# Patient Record
Sex: MALE | Race: WHITE
[De-identification: names, ages, dates, MRNs, and addresses within clinical notes are randomized per-mention and may not be internally consistent; named-entity substitution may affect disease eponyms.]

---

## 2017-09-19 NOTE — EDPHY
H & P


Stated Complaint: Abd/back pain, N/D, sweats x3 days


HPI/ROS: 





HPI





CHIEF COMPLAINT:  Abdominal pain, diarrhea, nausea





HISTORY OF PRESENT ILLNESS:  Patient very pleasant 37-year-old male significant 

past medical history for methamphetamine abuse, he presents to the emergency 

room with 24 hours of nausea, abdominal pain mainly right-sided right lower 

quadrant right upper quadrant, diarrhea that is nonbloody.  Associated nausea 

no fever no vomiting.  Denies chest pain or shortness of breath.  Pain is 

currently 410.  Diffuse.  Mainly right-sided.





Additional note patient is requesting STI testing.  He is unsure if he is a 

current gonorrhea chlamydia.  He has no symptoms but states that him and his ex-

girlfriend has been passing around an STI.





Past Medical History:  Methamphetamine abuse





Past Surgical History:  Tonsillectomy





Social History:  Daily methamphetamine use





Family History:  Noncontributory








ROS   


REVIEW OF SYSTEMS:


A comprehensive 10 point review of systems is otherwise negative aside from 

elements mentioned in the history of present illness.








Exam   


Constitutional  appears well nontoxic triage nursing summary reviewed, vital 

signs reviewed, awake/alert. 


Eyes   normal conjunctivae and sclera, EOMI, PERRLA. 


HENT   normal inspection, atraumatic, moist mucus membranes, no epistaxis, neck 

supple/ no meningismus, no raccoon eyes. 


Respiratory   clear to auscultation bilaterally, normal breath sounds, no 

respiratory distress, no wheezing. 


Cardiovascular   rate normal, regular rhythm, no murmur, no edema, distal 

pulses normal. 


Gastrointestinal   soft, mild tender palpation right upper quadrant and right 

side of the abdomen right lower quadrant, no rebound, no guarding, normal bowel 

sounds, no distension, no pulsatile mass. 


Genitourinary   no CVA tenderness. 


Musculoskeletal  no midline vertebral tenderness, full range of motion, no calf 

swelling, no tenderness of extremities, no meningismus, good pulses, 

neurovascularly intact.


Skin   pink, warm, & dry, no rash, skin atraumatic. 


Neurologic   awake, alert and oriented x 3, AAOx3, moves all 4 extremities 

equally, motor intact, sensory intact, CN II-XII intact, normal cerebellar, 

normal vision, normal speech. 


Psychiatric   normal mood/affect. 


Heme/Lymph/Immune   no lymphadenopathy.





Differential diagnosis includes but is not limited to and in no particular order

:  Bowel obstruction, appendicitis, gallbladder disease, diverticulitis, colitis

, enteritis, perforated viscus, gastritis, GERD, esophagitis, urinary tract 

infection, pyelonephritis, kidney stones





Medical Decision Making:  IV establishment, IV fluid bolus, Zofran for nausea, 

morphine for pain control, stool studies, abdominal labs, CT scan abdomen 

pelvis with IV contrast rule out acute appendicitis re-evaluate.





Re-evaluation:








CT scan of the abdomen pelvis with IV contrast  The results of the study are 

constipation.  Otherwise unremarkable CT scan.  The study was read by Dr. Malcolm  I viewed the images myself on the PACS system.








0340:  I did re-evaluate this patient this time resting comfortably no acute 

distress.  Abdomen is soft nontender.  He feels better.  Blood work is 

reviewed.  CT scan reviewed.  I do not appreciate anything acute he feels 

better.  Re-examination abdomen is soft.  Requesting be discharged home. 


Source: Patient





- Personal History


Current Tetanus/Diphtheria Vaccine: No


Current Tetanus Diphtheria and Acellular Pertussis (TDAP): No





- Medical/Surgical History


Hx Asthma: No


Hx Chronic Respiratory Disease: No


Hx Diabetes: No


Hx Cardiac Disease: No


Hx Renal Disease: No


Hx Cirrhosis: No


Hx Alcoholism: No


Hx HIV/AIDS: No


Hx Splenectomy or Spleen Trauma: No


Other PMH: tonsillectomy





- Social History


Smoking Status: Former smoker


Constitutional: 


 Initial Vital Signs











Temperature (C)  36.6 C   09/19/17 00:50


 


Heart Rate  112 H  09/19/17 00:50


 


Respiratory Rate  18   09/19/17 00:50


 


Blood Pressure  129/92 H  09/19/17 00:50


 


O2 Sat (%)  97   09/19/17 00:50








 











O2 Delivery Mode               Room Air














Allergies/Adverse Reactions: 


 





No Known Allergies Allergy (Verified 09/19/17 00:54)


 








Home Medications: 














 Medication  Instructions  Recorded


 


NK [No Known Home Meds]  09/19/17














Medical Decision Making





- Data Points


Laboratory Results: 


 Laboratory Results





 09/19/17 01:13 





 09/19/17 01:13 





 











  09/19/17 09/19/17 09/19/17





  02:45 02:45 01:13


 


WBC      





    


 


RBC      





    


 


Hgb      





    


 


Hct      





    


 


MCV      





    


 


MCH      





    


 


MCHC      





    


 


RDW      





    


 


Plt Count      





    


 


MPV      





    


 


Neut % (Auto)      





    


 


Lymph % (Auto)      





    


 


Mono % (Auto)      





    


 


Eos % (Auto)      





    


 


Baso % (Auto)      





    


 


Nucleat RBC Rel Count      





    


 


Absolute Neuts (auto)      





    


 


Absolute Lymphs (auto)      





    


 


Absolute Monos (auto)      





    


 


Absolute Eos (auto)      





    


 


Absolute Basos (auto)      





    


 


Absolute Nucleated RBC      





    


 


Immature Gran %      





    


 


Immature Gran #      





    


 


Sodium      143 mEq/L mEq/L





     (134-144) 


 


Potassium      4.2 mEq/L mEq/L





     (3.5-5.2) 


 


Chloride      104 mEq/L mEq/L





     () 


 


Carbon Dioxide      25 mEq/l mEq/l





     (22-31) 


 


Anion Gap      14 mEq/L mEq/L





     (8-16) 


 


BUN      14 mg/dL mg/dL





     (7-23) 


 


Creatinine      0.9 mg/dL mg/dL





     (0.7-1.3) 


 


Estimated GFR      > 60 





    


 


Glucose      94 mg/dL mg/dL





     () 


 


Calcium      9.9 mg/dL mg/dL





     (8.5-10.4) 


 


Total Bilirubin      0.7 mg/dL mg/dL





     (0.1-1.4) 


 


Conjugated Bilirubin      0.3 mg/dL mg/dL





     (0.0-0.5) 


 


Unconjugated Bilirubin      0.4 mg/dL mg/dL





     (0.0-1.1) 


 


AST      27 IU/L IU/L





     (17-59) 


 


ALT      48 IU/L IU/L





     (21-72) 


 


Alkaline Phosphatase      55 IU/L IU/L





     () 


 


Total Protein      7.7 g/dL g/dL





     (6.3-8.2) 


 


Albumin      4.7 g/dL g/dL





     (3.5-5.0) 


 


Lipase      133 IU/L IU/L





     () 


 


Urine Color    YELLOW   





    


 


Urine Appearance    CLEAR   





    


 


Urine pH    6.0   





    (5.0-7.5)  


 


Ur Specific Gravity    > 1.035  H   





    (1.002-1.030)  


 


Urine Protein    NEGATIVE   





    (NEGATIVE)  


 


Urine Ketones    NEGATIVE   





    (NEGATIVE)  


 


Urine Blood    NEGATIVE   





    (NEGATIVE)  


 


Urine Nitrate    NEGATIVE   





    (NEGATIVE)  


 


Urine Bilirubin    NEGATIVE   





    (NEGATIVE)  


 


Urine Urobilinogen    NEGATIVE EU EU  





    (0.2-1.0)  


 


Ur Leukocyte Esterase    NEGATIVE   





    (NEGATIVE)  


 


Urine Glucose    NEGATIVE   





    (NEGATIVE)  


 


N.gonorrhoeae RNA (TMA)  Pending     





    














  09/19/17





  01:13


 


WBC  6.78 10^3/uL 10^3/uL





   (3.80-9.50) 


 


RBC  5.56 10^6/uL 10^6/uL





   (4.40-6.38) 


 


Hgb  16.6 g/dL g/dL





   (13.7-17.5) 


 


Hct  48.5 % %





   (40.0-51.0) 


 


MCV  87.2 fL fL





   (81.5-99.8) 


 


MCH  29.9 pg pg





   (27.9-34.1) 


 


MCHC  34.2 g/dL g/dL





   (32.4-36.7) 


 


RDW  14.0 % %





   (11.5-15.2) 


 


Plt Count  271 10^3/uL 10^3/uL





   (150-400) 


 


MPV  9.8 fL fL





   (8.7-11.7) 


 


Neut % (Auto)  59.0 % %





   (39.3-74.2) 


 


Lymph % (Auto)  27.6 % %





   (15.0-45.0) 


 


Mono % (Auto)  10.0 % %





   (4.5-13.0) 


 


Eos % (Auto)  2.5 % %





   (0.6-7.6) 


 


Baso % (Auto)  0.6 % %





   (0.3-1.7) 


 


Nucleat RBC Rel Count  0.0 % %





   (0.0-0.2) 


 


Absolute Neuts (auto)  4.00 10^3/uL 10^3/uL





   (1.70-6.50) 


 


Absolute Lymphs (auto)  1.87 10^3/uL 10^3/uL





   (1.00-3.00) 


 


Absolute Monos (auto)  0.68 10^3/uL 10^3/uL





   (0.30-0.80) 


 


Absolute Eos (auto)  0.17 10^3/uL 10^3/uL





   (0.03-0.40) 


 


Absolute Basos (auto)  0.04 10^3/uL 10^3/uL





   (0.02-0.10) 


 


Absolute Nucleated RBC  0.00 10^3/uL 10^3/uL





   (0-0.01) 


 


Immature Gran %  0.3 % %





   (0.0-1.1) 


 


Immature Gran #  0.02 10^3/uL 10^3/uL





   (0.00-0.10) 


 


Sodium  





  


 


Potassium  





  


 


Chloride  





  


 


Carbon Dioxide  





  


 


Anion Gap  





  


 


BUN  





  


 


Creatinine  





  


 


Estimated GFR  





  


 


Glucose  





  


 


Calcium  





  


 


Total Bilirubin  





  


 


Conjugated Bilirubin  





  


 


Unconjugated Bilirubin  





  


 


AST  





  


 


ALT  





  


 


Alkaline Phosphatase  





  


 


Total Protein  





  


 


Albumin  





  


 


Lipase  





  


 


Urine Color  





  


 


Urine Appearance  





  


 


Urine pH  





  


 


Ur Specific Gravity  





  


 


Urine Protein  





  


 


Urine Ketones  





  


 


Urine Blood  





  


 


Urine Nitrate  





  


 


Urine Bilirubin  





  


 


Urine Urobilinogen  





  


 


Ur Leukocyte Esterase  





  


 


Urine Glucose  





  


 


N.gonorrhoeae RNA (TMA)  





  











Medications Given: 


 








Discontinued Medications





Sodium Chloride (Ns)  1,000 mls @ 0 mls/hr IV EDNOW ONE; [place]


   PRN Reason: Protocol


   Stop: 09/19/17 01:06


   Last Admin: 09/19/17 01:22 Dose:  1,000 mls


Morphine Sulfate (Morphine)  4 mg IVP EDNOW ONE


   Stop: 09/19/17 01:12


   Last Admin: 09/19/17 01:24 Dose:  4 mg


Ondansetron HCl (Zofran)  4 mg IVP EDNOW ONE


   Stop: 09/19/17 01:06


   Last Admin: 09/19/17 01:22 Dose:  4 mg








Departure





- Departure


Disposition: Home, Routine, Self-Care


Clinical Impression: 


Abdominal pain


Qualifiers:


 Abdominal location: generalized Qualified Code(s): R10.84 - Generalized 

abdominal pain





Condition: Good


Instructions:  Acute Abdominal Pain (ED)


Additional Instructions: 


1. [place] diet next 24-48 hours.


2. Return to the ER for worsening abdominal pain fever vomiting.


Referrals: 


NONE *PRIMARY CARE P,. [Primary Care Provider] - As per Instructions

## 2018-01-18 ENCOUNTER — HOSPITAL ENCOUNTER (EMERGENCY)
Dept: HOSPITAL 80 - FED | Age: 38
Discharge: HOME | End: 2018-01-18
Payer: MEDICAID

## 2018-01-18 VITALS
OXYGEN SATURATION: 95 % | DIASTOLIC BLOOD PRESSURE: 90 MMHG | SYSTOLIC BLOOD PRESSURE: 120 MMHG | TEMPERATURE: 98.1 F | HEART RATE: 134 BPM

## 2018-01-18 VITALS — RESPIRATION RATE: 18 BRPM

## 2018-01-18 DIAGNOSIS — Z87.891: ICD-10-CM

## 2018-01-18 DIAGNOSIS — R09.89: Primary | ICD-10-CM

## 2018-01-18 NOTE — EDPHY
H & P


Time Seen by Provider: 01/18/18 18:50


HPI/ROS: 


CHIEF COMPLAINT: Choked on food, chest tightness, coughing





HISTORY OF PRESENT ILLNESS: 


The patient is a 36 y/o male complaining of chest tightness after choking on 

trail mix this evening.  He was eating trail mix while he was driving and felt 

like the trail mix slipped down into his lungs.  He did not cough or feels 

short of breath initially.  When he got home he sat in a chair and watch TV.  

When he stood up from the chair, he began coughing, vomiting, and felt dizzy.  

The coughing has now resolved and he is asymptomatic.  Denies fever, urinary or 

bowel complaints, recent illness, paresthesias or other pertinent symptoms.





REVIEW OF SYSTEMS:


Aside from elements discussed in the HPI, a comprehensive 10-point review of 

systems was reviewed and is negative.





Past Medical/Surgical History: 


Tonsillectomy





Social History: 


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





Smoking Status: Former smoker


Physical Exam: 


General Appearance: Alert, pleasant


Eyes: Pupils equal and round, no conjunctival pallor


ENT, Mouth: Mucous membranes moist


Neck: Normal inspection


Respiratory: Lungs are clear to auscultation


Cardiovascular: Regular rate and rhythm 


Gastrointestinal:  Abdomen is soft and non-tender 


Neurological:  A&O, nonfocal


Skin:  Warm and dry


Extremities:  Normal inspection


Psychiatric: Mood and affect normal





Constitutional: 


 Initial Vital Signs











Temperature (C)  36.5 C   01/18/18 16:50


 


Heart Rate  98   01/18/18 16:50


 


Respiratory Rate  18   01/18/18 16:50


 


Blood Pressure  149/96 H  01/18/18 16:50


 


O2 Sat (%)  98   01/18/18 16:50








 











O2 Delivery Mode               Room Air














Allergies/Adverse Reactions: 


 





No Known Allergies Allergy (Verified 01/18/18 16:50)


 








Home Medications: 














 Medication  Instructions  Recorded


 


NK [No Known Home Meds]  09/19/17














Medical Decision Making





- Diagnostics


Imaging Results: 


Chest x-ray independently reviewed by me reveals no acute disease.





ED Course/Re-evaluation: 


The patient is a 36 y/o male complaining of chest tightness and coughing after 

choking on trail mix today. He is currently asymptomatic. His physical exam is 

normal. Chest x-ray ordered.





1852: I reviewed the patient's chest x-ray; there are no acute findings.





Reassessed patient and discussed imaging findings.  Likely aspiration, now 

resolved after multiple episodes of vomiting and coughing.  I have given him 

precautions regarding aspiration and a follow up with Dr. Vital, pulmonologist, 

if his symptoms recur. Return precautions provided; patient is comfortable with 

this plan.





Departure





- Departure


Disposition: Home, Routine, Self-Care


Clinical Impression: 


 Choking episode





Condition: Good


Instructions:  Aspiration Precautions (ED)


Additional Instructions: 


Follow up with a pulmonologist if you develope recurrent symptoms. You have 

been referred to Dr. Erik Vital.





Return to the emergency department if you experience shortness of breath, chest 

pain, uncontrollable coughing, fever or other worsening of your symptoms.


Referrals: 


[facility],. [Clinic] - As per Instructions


Erik Vital MD [Medical Doctor] - As per Instructions


Report Scribed for: Yin Zhong


Report Scribed by: Yovana Ahn


Date of Report: 01/18/18


Time of Report: 18:51


Physician Review and Approval Statement: 





01/18/18 18:51


Portions of this note were transcribed by a medical scribe.  I personally 

performed a history, physical exam, medical decision making, and confirmed 

accuracy of information the transcribed note.

## 2018-02-12 ENCOUNTER — HOSPITAL ENCOUNTER (EMERGENCY)
Dept: HOSPITAL 80 - FED | Age: 38
Discharge: HOME | End: 2018-02-12
Payer: MEDICAID

## 2018-02-12 VITALS
DIASTOLIC BLOOD PRESSURE: 97 MMHG | OXYGEN SATURATION: 96 % | TEMPERATURE: 97.5 F | SYSTOLIC BLOOD PRESSURE: 139 MMHG | RESPIRATION RATE: 16 BRPM | HEART RATE: 86 BPM

## 2018-02-12 DIAGNOSIS — Z87.891: ICD-10-CM

## 2018-02-12 DIAGNOSIS — R07.89: Primary | ICD-10-CM

## 2018-02-12 LAB — PLATELET # BLD: 273 10^3/UL (ref 150–400)

## 2018-02-12 NOTE — CPEKG
Heart Rate: 103

RR Interval: 583

P-R Interval: 144

QRSD Interval: 78

QT Interval: 340

QTC Interval: 445

P Axis: 66

QRS Axis: 85

T Wave Axis: 45

EKG Severity - OTHERWISE NORMAL ECG -

EKG Impression: SINUS TACHYCARDIA

Electronically Signed By: Ten Green 12-Feb-2018 15:07:30

## 2018-02-12 NOTE — EDPHY
H & P


Stated Complaint: chest tightness sob x 10 min


Time Seen by Provider: 02/12/18 11:44


HPI/ROS: 





CHIEF COMPLAINT:  Left-sided chest pain





HISTORY OF PRESENT ILLNESS:  37-year-old male generally healthy drove himself 

to the ER complaining of acute midsternal left-sided chest pain radiating to 

his back which started approximately 1 hr prior to evaluation.  Pain continues.

  Pain is reproducible with palpation range of motion of his left upper 

extremity No radiation of pain to his upper extremities.  No jaw pain.  No 

syncope or near syncope.  No dyspnea.  No abdominal pain.  No nausea or 

vomiting.  No illicit drug or alcohol use.  No diaphoresis.  No headache.











REVIEW OF SYSTEMS:


A ten point review of systems was performed and is negative with the exception 

of the items mentioned in the HPI








PAST MEDICAL & SURGICAL  HISTORY: no vasculopathy or cardiac history   





SOCIAL HISTORY:Nonsmoker no drug use     





FAMILY HISTORY:  No family history of premature coronary artery disease or 

sudden unexplained death








************


PHYSICAL EXAM





(Prior to examination, patient consented to physical exam, hands were washed 

and my usual and customary physical exam procedures followed)


1) GENERAL: Well-developed, well-nourished, alert and oriented.  Appears to be 

in no acute distress.


2) HEAD: Normocephalic, atraumatic


3) HEENT: Pupils equal, round, reactive to light bilaterally.  Sclera 

anicteric.  Nasopharynx, oropharynx, clear, no lesions.  Ears bilaterally with 

normal tympanic membranes.


4) NECK: Full range of motion, no meningeal signs. No carotid bruit


5) LUNGS: Clear auscultation bilaterally, no wheezes, no rhonchi, no 

retractions.   


6) HEART: Tender to palpation left medial chest wall which is also reproducible 

with movement of the left upper extremity at same location.  No crepitus.  

Regular rate and rhythm, no murmur, no heave, no gallop.


7) ABDOMEN: No guarding, no rebound, no focal tenderness, negative McBurney's, 

negative Hurtado's, negative Rovsing's, negative peritoneal sign,


8) MUSCULOSKELETAL: Moving all extremities, no focal areas of tenderness, no 

obvious trauma.  No peripheral edema or discoloration.


9) BACK: No CVA tenderness, no midline vertebral tenderness, no fluctuance, no 

step-off, no obvious trauma, no visual or palpable abnormality. 


10) SKIN: No rash, no petechiae. 


11) Psychiatric:  Patient is oriented X 3, there is no agitation.








***************





DIFFERENTIAL DIAGNOSIS:   In no particular order, including but not limited to 

myocardial ischemia, pulmonary embolus, chest wall pain, pleural inflammation 

and pulmonary infectious causes.








- Personal History


Current Tetanus/Diphtheria Vaccine: No


Current Tetanus Diphtheria and Acellular Pertussis (TDAP): No





- Medical/Surgical History


Hx Asthma: No


Hx Chronic Respiratory Disease: No


Hx Diabetes: No


Hx Cardiac Disease: No


Hx Renal Disease: No


Hx Cirrhosis: No


Hx Alcoholism: No


Hx HIV/AIDS: No


Hx Splenectomy or Spleen Trauma: No


Other PMH: tonsillectomy





- Social History


Smoking Status: Former smoker


Constitutional: 


 Initial Vital Signs











Temperature (C)  37.0 C   02/12/18 11:36


 


Heart Rate  108 H  02/12/18 11:36


 


Respiratory Rate  20   02/12/18 11:36


 


O2 Sat (%)  97   02/12/18 11:36








 











O2 Delivery Mode               Room Air














Allergies/Adverse Reactions: 


 





No Known Allergies Allergy (Verified 01/18/18 16:50)


 








Home Medications: 














 Medication  Instructions  Recorded


 


Ibuprofen [Motrin (*)] 600 mg PO Q6 #15 tab 02/12/18














Medical Decision Making





- Diagnostics


Imaging Results: 


 Imaging Impressions





Chest X-Ray  02/12/18 11:58


Impression: Normal chest.











ED Course/Re-evaluation: 





This patient was re-evaluated with serial examinations.  Diagnostic results 

were discussed with him.  At most recent examination at 2:20 p.m. he states 

that he is feeling improvement.  I discussed his family history which is not 

concerning for acute coronary syndrome .  Addition I discussed his low risk 

likelihood for acute coronary syndrome.  Addition has a negative D-dimer which 

I think adequately excludes pulmonary embolus in this patient whom I have a low 

to medium suspicion for pulmonary embolus.  He has reproducible chest pain with 

palpation and movement which I think is more than likely secondary to 

musculoskeletal etiology.  I recommended NSAIDs.  I do not think that further 

diagnostic studies, emergent cardiology consultation  or admission currently 

indicated.  He is agreeable with this.  He feels comfortable being discharged.  

All questions and concerns addressed by myself.  Care of patient under 

supervision of secondary supervising physician Dr Ten Green with whom I 

discussed case.  Usual and customary discharge precautions and instructions 

provided





- Data Points


Laboratory Results: 


 Laboratory Results





 02/12/18 11:55 





 02/12/18 11:55 





 











  02/12/18 02/12/18 02/12/18





  11:55 11:55 11:55


 


WBC      5.07 10^3/uL 10^3/uL





     (3.80-9.50) 


 


RBC      6.27 10^6/uL 10^6/uL





     (4.40-6.38) 


 


Hgb      18.7 g/dL H g/dL





     (13.7-17.5) 


 


Hct      53.4 % H %





     (40.0-51.0) 


 


MCV      85.2 fL fL





     (81.5-99.8) 


 


MCH      29.8 pg pg





     (27.9-34.1) 


 


MCHC      35.0 g/dL g/dL





     (32.4-36.7) 


 


RDW      15.4 % H %





     (11.5-15.2) 


 


Plt Count      273 10^3/uL 10^3/uL





     (150-400) 


 


MPV      9.6 fL fL





     (8.7-11.7) 


 


Neut % (Auto)      50.4 % %





     (39.3-74.2) 


 


Lymph % (Auto)      36.1 % %





     (15.0-45.0) 


 


Mono % (Auto)      10.5 % %





     (4.5-13.0) 


 


Eos % (Auto)      2.0 % %





     (0.6-7.6) 


 


Baso % (Auto)      0.8 % %





     (0.3-1.7) 


 


Nucleat RBC Rel Count      0.0 % %





     (0.0-0.2) 


 


Absolute Neuts (auto)      2.56 10^3/uL 10^3/uL





     (1.70-6.50) 


 


Absolute Lymphs (auto)      1.83 10^3/uL 10^3/uL





     (1.00-3.00) 


 


Absolute Monos (auto)      0.53 10^3/uL 10^3/uL





     (0.30-0.80) 


 


Absolute Eos (auto)      0.10 10^3/uL 10^3/uL





     (0.03-0.40) 


 


Absolute Basos (auto)      0.04 10^3/uL 10^3/uL





     (0.02-0.10) 


 


Absolute Nucleated RBC      0.00 10^3/uL 10^3/uL





     (0-0.01) 


 


Immature Gran %      0.2 % %





     (0.0-1.1) 


 


Immature Gran #      0.01 10^3/uL 10^3/uL





     (0.00-0.10) 


 


D-Dimer    < 0.27 ug/mLFEU ug/mLFEU  





    (0.00-0.50)  


 


Sodium  144 mEq/L mEq/L    





   (135-145)   


 


Potassium  4.9 mEq/L mEq/L    





   (3.5-5.2)   


 


Chloride  106 mEq/L mEq/L    





   ()   


 


Carbon Dioxide  21 mEq/l L mEq/l    





   (22-31)   


 


Anion Gap  17 mEq/L H mEq/L    





   (8-16)   


 


BUN  10 mg/dL mg/dL    





   (7-23)   


 


Creatinine  0.9 mg/dL mg/dL    





   (0.7-1.3)   


 


Estimated GFR  > 60     





    


 


Glucose  95 mg/dL mg/dL    





   ()   


 


Calcium  9.8 mg/dL mg/dL    





   (8.5-10.4)   


 


Total Bilirubin  1.8 mg/dL H mg/dL    





   (0.1-1.4)   


 


Conjugated Bilirubin  0.4 mg/dL mg/dL    





   (0.0-0.5)   


 


Unconjugated Bilirubin  1.4 mg/dL H mg/dL    





   (0.0-1.1)   


 


AST  59 IU/L IU/L    





   (17-59)   


 


ALT  46 IU/L IU/L    





   (21-72)   


 


Alkaline Phosphatase  79 IU/L IU/L    





   ()   


 


Troponin I  < 0.012 ng/mL ng/mL    





   (0.000-0.034)   


 


Total Protein  8.0 g/dL g/dL    





   (6.3-8.2)   


 


Albumin  4.7 g/dL g/dL    





   (3.5-5.0)   


 


Lipase  114 IU/L IU/L    





   ()   


 


Specimen Hemolysis  103     





    











Medications Given: 


 








Discontinued Medications





Aspirin (Aspirin)  324 mg PO EDNOW ONE


   Stop: 02/12/18 11:58


   Last Admin: 02/12/18 11:58 Dose:  324 mg


Ketorolac Tromethamine (Toradol)  15 mg IVP EDNOW ONE


   Stop: 02/12/18 13:16


   Last Admin: 02/12/18 13:25 Dose:  15 mg








Departure





- Departure


Disposition: Home, Routine, Self-Care


Clinical Impression: 


 Chest wall pain





Condition: Good


Instructions:  Chest Pain (ED), Chest Wall Pain (ED)


Additional Instructions: 


Seek immediate medical attention if you developed worsening or new chest pain, 

if you develop shortness of breath, or any other symptoms that concern you.


Referrals: 


Artem Mathias MD [[facility] Primary Care Provider] - 2-3 days, call for appt.


Prescriptions: 


Ibuprofen [Motrin (*)] 600 mg PO Q6 #15 tab

## 2018-02-14 ENCOUNTER — HOSPITAL ENCOUNTER (EMERGENCY)
Dept: HOSPITAL 80 - FED | Age: 38
Discharge: HOME | End: 2018-02-14
Payer: MEDICAID

## 2018-02-14 VITALS
SYSTOLIC BLOOD PRESSURE: 122 MMHG | RESPIRATION RATE: 20 BRPM | TEMPERATURE: 98.2 F | DIASTOLIC BLOOD PRESSURE: 84 MMHG | HEART RATE: 118 BPM | OXYGEN SATURATION: 96 %

## 2018-02-14 DIAGNOSIS — Z87.891: ICD-10-CM

## 2018-02-14 DIAGNOSIS — J10.1: Primary | ICD-10-CM

## 2018-02-14 NOTE — EDPHY
H & P


Stated Complaint: flu like symptoms/cough/congestions/fever/dizzy


Time Seen by Provider: 02/14/18 15:15


HPI/ROS: 


HPI:  This is a 37-year-old male who presents with





Chief Complaint: flu like symptoms/cough/congestions/fever/dizzy





Location: body 


Quality:fever 


Duration:  Since last night


Signs and Symptoms: + subjective fever, no wheezing, no shortness of breath, no 

chest pain, no abdominal pain, no nausea, no vomiting, no dysphagia


Timing:  Acute


Severity:  Mild


Context:  Patient is a former smoker, generally healthy, presents with 

complaints of sudden onset of low-grade fever and body aches starting last 

night accompanied by and productive cough.  He is concerned as when he was at 

the gym yesterday he took a meloxicam from his friend for post workout 

soreness.  He admits that he should not take medications that are not 

prescribed for him.  Patient reports that when he swallowed the white tablet if 

felt like it was stuck in his upper esophagus.  He started to cough and then 

vomited.  Patient is concerned that he may have aspirated the pill fragments 

and now has pneumonia.  No history of lung disease.  Patient is able to eat and 

drink fluids without difficulty since the incident.  No history of GERD/

dysphagia. 


Modifying Factors:  None





Comment: 








ROS: see HPI


Constitutional: No fever, no chills, no weight loss


Eyes:  No blurred vision


Respiratory:  No shortness of breath, no cough


Cardiovascular:  No chest pain


Gastrointestinal:  No nausea, no vomiting, no diarrhea 


Genitourinary:  No dysuria 


Extremities:  No myalgias 


Neurologic:  No weakness, no numbness


Skin:  No rashes


Hematologic:  No bruising, no bleeding





MEDICAL/SURGICAL/SOCIAL HISTORY: 


Medical history:  Generally healthy.  Does not take any regular medications.


Surgical history:  Tonsillectomy


Social history:  Employed. 











CONSTITUTIONAL:  Extremely well-appearing but anxious adult white male, awake 

and alert, no obvious distress


HEENT: Atraumatic and normocephalic, PERRL, EOMI. Tympanic membranes clear. 

Oropharynx clear, no exudate and moist pink mucosa.  Airway patent.  No 

lymphadenopathy.  No meningismus.


Cardiovascular: Normal S1/S2, tachycardia, regular rhythm, without murmur rub 

or gallop.


PULMONARY/CHEST:  Symmetrical and nontender. Clear to auscultation bilaterally. 

Good air movement. No accessory muscle usage.


ABDOMEN:  Soft, nondistended, nontender, no rebound, no guarding, no peritoneal 

signs, no masses or organomegaly. No CVAT.


EXTREMITIES:  2/2 pulses, strength 5/5, no deformities, no clubbing, no 

cyanosis or edema.


NEUROLOGICAL: no focal neuro deficits.  GCS 15.








Source: Patient


Exam Limitations: No limitations





- Personal History


Current Tetanus/Diphtheria Vaccine: Yes





- Medical/Surgical History


Hx Asthma: No


Hx Chronic Respiratory Disease: No


Hx Diabetes: No


Hx Cardiac Disease: No


Hx Renal Disease: No


Hx Cirrhosis: No


Hx Alcoholism: No


Hx HIV/AIDS: No


Hx Splenectomy or Spleen Trauma: No


Other PMH: tonsillectomy





- Social History


Smoking Status: Former smoker


Constitutional: 


 Initial Vital Signs











Temperature (C)  36.8 C   02/14/18 14:53


 


Heart Rate  118 H  02/14/18 14:53


 


Respiratory Rate  20   02/14/18 14:53


 


Blood Pressure  122/84 H  02/14/18 14:53


 


O2 Sat (%)  96   02/14/18 14:53








 











O2 Delivery Mode               Room Air














Allergies/Adverse Reactions: 


 





No Known Allergies Allergy (Verified 02/14/18 14:53)


 








Home Medications: 














 Medication  Instructions  Recorded


 


Ibuprofen [Motrin (*)] 600 mg PO Q6 #15 tab 02/12/18


 


Benzonatate [Tessalon Pearles (RX)] 100 mg PO Q6 PRN #15 cap 02/14/18


 


Oseltamivir Phosphate [Tamiflu 75 75 mg PO BID #10 cap 02/14/18





mg (*)]  














Medical Decision Making


ED Course/Re-evaluation: 


Chest x-ray, oral medications, influenza test ordered


Given GI cocktail 


No signs of respiratory distress/airway compromise/hypoxia/wheezing


Influenza B positive; Tamiflu candidate and patient is insistent on having due 

to recent travel outside of the country


Chest x-ray my read shows no effusion, no opacity, no pneumothorax, no widened 

mediastinum


Advised supportive care








This patient was seen under the supervision of my primary supervising 

physician.  I evaluated care for this patient independently.  





Differential Diagnosis: 


Differential diagnosis includes but is not limited to pill esophagitis, GERD, 

viral syndrome, influenza, pneumonitis. 








- Data Points


Laboratory Results: 


 











  02/14/18





  14:50


 


Nasal Influenza A PCR  NEGATIVE FOR FLU A 





   (NEGATIVE) 


 


Nasal Influenza B PCR  FLU B DETECTED  H 





   (NEGATIVE) 


 


RSV (PCR)  NEGATIVE FOR RSV 





   (NEGATIVE) 











Medications Given: 


 








Discontinued Medications





Al Hydroxide/Mg Hydroxide (Maalox Susp)  30 ml PO ONCE ONE


   Stop: 02/14/18 15:27


   Last Admin: 02/14/18 15:44 Dose:  30 ml


Hyoscyamine Sulfate (Levsin, Hyomax-Sl)  0.25 mg PO ONCE ONE


   Stop: 02/14/18 15:27


   Last Admin: 02/14/18 15:44 Dose:  0.25 mg


Lidocaine (Lidocaine 2% Viscous)  15 ml PO ONCE ONE


   Stop: 02/14/18 15:27


   Last Admin: 02/14/18 15:44 Dose:  15 ml








Departure





- Departure


Disposition: Home, Routine, Self-Care


Clinical Impression: 


 Influenza B





Condition: Good


Instructions:  Influenza (ED)


Additional Instructions: 


Consume a minimum of 64 oz of water or electrolyte fluid replacement drinks 

that include Gatorade, Powerade, Pedialyte. 


Eat a bland diet for the next 48 hours and then slowly advance as tolerated. 


Take Zofran 1 tab every 4 hours as needed for nausea, vomiting. 


Rest as much as possible until you are feeling better. 


Take Tylenol 650 mg every 4 hr and/or ibuprofen 600 mg every 6-8 hours as 

needed for pain, fever. 


 


Referrals: 


[facility],. [Clinic] - As per Instructions


Prescriptions: 


Benzonatate [Tessalon Pearles (RX)] 100 mg PO Q6 PRN #15 cap


 PRN Reason: Cough, Moderate


Oseltamivir Phosphate [Tamiflu 75 mg (*)] 75 mg PO BID #10 cap

## 2018-12-09 ENCOUNTER — HOSPITAL ENCOUNTER (EMERGENCY)
Dept: HOSPITAL 80 - FED | Age: 38
LOS: 1 days | Discharge: HOME | End: 2018-12-10
Payer: MEDICAID

## 2018-12-09 ENCOUNTER — HOSPITAL ENCOUNTER (EMERGENCY)
Dept: HOSPITAL 80 - FED | Age: 38
Discharge: HOME | End: 2018-12-09
Payer: MEDICAID

## 2018-12-09 VITALS — DIASTOLIC BLOOD PRESSURE: 78 MMHG | SYSTOLIC BLOOD PRESSURE: 125 MMHG

## 2018-12-09 DIAGNOSIS — Z86.14: ICD-10-CM

## 2018-12-09 DIAGNOSIS — L03.116: Primary | ICD-10-CM

## 2018-12-09 LAB — PLATELET # BLD: 349 10^3/UL (ref 150–400)

## 2018-12-09 RX ADMIN — Medication ONE MLS: at 23:47

## 2018-12-09 NOTE — EDPHY
H & P


Stated Complaint: L pinky toe area of pain/swelling x 2 days -" spiderbite"  ?


Time Seen by Provider: 12/09/18 08:49





- Medical/Surgical History


Hx Asthma: No


Hx Chronic Respiratory Disease: No


Hx Diabetes: No


Hx Cardiac Disease: No


Hx Renal Disease: No


Hx Cirrhosis: No


Hx Alcoholism: No


Hx HIV/AIDS: No


Hx Splenectomy or Spleen Trauma: No


Other PMH: tonsillectomy





- Social History


Smoking Status: Former smoker


Constitutional: 


 Initial Vital Signs











Temperature (C)  36.7 C   12/09/18 08:42


 


Heart Rate  75   12/09/18 08:42


 


Respiratory Rate  16   12/09/18 08:42


 


Blood Pressure  126/74 H  12/09/18 08:42


 


O2 Sat (%)  97   12/09/18 08:42








 











O2 Delivery Mode               Room Air














Allergies/Adverse Reactions: 


 





No Known Allergies Allergy (Verified 02/14/18 14:53)


 








Home Medications: 














 Medication  Instructions  Recorded


 


Cephalexin [Keflex (RX)] 500 mg PO TID #20 cap 12/09/18


 


Sulfamethox/Tmp 800/160 mg 1 tab PO BID #14 tab 12/09/18





[Bactrim Ds]  














Medical Decision Making


ED Course/Re-evaluation: 





CHIEF COMPLAINT:  Red, swollen left foot





HISTORY OF PRESENT ILLNESS:  The patient is a 39 y/o male arriving with his 

family complaining of acute onset redness, swelling, and pain in his left foot 

upon waking yesterday morning. Symptoms have worsened today. He cannot identify 

any preceding injury or other insult he can identify, though he does mention he 

works standing on his feet 10 hours a day. He denies fever, chills, or any 

symptoms of systemic illness. No history of diabetes or immunocompromise. 





REVIEW OF SYSTEMS:  





A comprehensive 10 system review of systems is otherwise negative aside from 

elements mentioned in the history of present illness and medical decision 

making.





PHYSICAL EXAM:  





HR, BP, O2 Sat, RR.  Temp noted


General Appearance:  Alert, well hydrated, appropriate, and non-toxic appearing.


Head:  Atraumatic without scalp tenderness or obvious injury


Eyes:  Pupils equal, round, reactive to light and accommodation, EOMI, no trauma

, no injection.


Nose:  Atraumatic, no rhinorrhea, clear.


Throat: Mucus membranes moist.


Neck:  Supple


Respiratory:  No retractions, no distress, no wheezes, and no accessory muscle 

use.  Lungs are clear to auscultation bilaterally.


Cardiovascular:  Regular rate and rhythm, no murmurs, rubs, or gallops. Good 

capillary refill all extremities.


Gastrointestinal:  Abdomen is soft, nontender, non-distended, no masses, no 

rebound, no guarding, no peritoneal signs.


Musculoskeletal:  Left foot: Left little toe bright red with associated 

tenderness, lateral and dorsum of foot somewhat cellulitic, 3-4 flat and non-

fluid-filled erythematous patches on dorsum of foot. No lymphangitis or 

streaking. 


Otherwise normal active ROM of all extremities, atraumatic.


Neurological:  Alert, appropriate, and interactive.  Nonfocal. 


Skin:  No rashes, good turgor, no nodules on palpation.





Past medical history: Denies


Past surgical history: Tonsillectomy


Family history: Noncontributory


Social history: Family at bedside. Former smoker. Goes to [employer] meetings. 





DIFFERENTIAL DIAGNOSIS:   The differential diagnosis for the patient's symptoms 

included but was not limited to cellulitis, staph infection, strep infection, 

insect bite, gout, and sepsis.





MEDICAL DECISION MAKING:  





This is a 39 y/o male who presents with a 1-day history of cellulitis on his 

left foot. Concern for staph and strep infection. No lymphangitis or systemic 

symptoms. Plan for dual-coverage treatment with Bactrim and Keflex. Doubt 

necrotizing fascitis. Discussed strict follow up and return precautions. He 

understands he must return to the ED if symptoms worsen at all or fail to 

improve on oral antibiotics. He is comfortable with this plan. 





Departure





- Departure


Disposition: Home, Routine, Self-Care


Clinical Impression: 


Cellulitis


Qualifiers:


 Site of cellulitis: extremity Site of cellulitis of extremity: lower extremity 

Laterality: left Qualified Code(s): L03.116 - Cellulitis of left lower limb





Condition: Good


Instructions:  Cephalexin (By mouth), Sulfamethoxazole/Trimethoprim (By mouth), 

Cellulitis (ED)


Additional Instructions: 


1. Take Bactrim and Keflex as prescribed without fail. Be sure to complete the 

entire prescription even if symptoms have resolved. 


2. Use ibuprofen and Tylenol as directed as needed for pain or if you develop a 

fever. 


3. It is imperative that you return to the ED for any worsening of your 

symptoms including spread of redness, swelling, or rash up your leg, fever, 

chills, or other feelings of being ill systemically. You should also return if 

symptoms have not improved over the next 24-48 hours. 


Referrals: 


Herman Hale MD [Medical Doctor] - As per Instructions


[facility],. [Clinic] - As per Instructions


Prescriptions: 


Cephalexin [Keflex (RX)] 500 mg PO TID #20 cap


Sulfamethox/Tmp 800/160 mg [Bactrim Ds] 1 tab PO BID #14 tab


Report Scribed for: Ten Green


Report Scribed by: Valeria Grossman


Date of Report: 12/09/18


Time of Report: 09:00

## 2018-12-10 ENCOUNTER — HOSPITAL ENCOUNTER (EMERGENCY)
Dept: HOSPITAL 80 - FED | Age: 38
Discharge: HOME | End: 2018-12-10
Payer: MEDICAID

## 2018-12-10 VITALS — SYSTOLIC BLOOD PRESSURE: 125 MMHG | DIASTOLIC BLOOD PRESSURE: 71 MMHG

## 2018-12-10 VITALS — SYSTOLIC BLOOD PRESSURE: 115 MMHG | DIASTOLIC BLOOD PRESSURE: 67 MMHG

## 2018-12-10 DIAGNOSIS — L03.116: Primary | ICD-10-CM

## 2018-12-10 DIAGNOSIS — Z79.2: ICD-10-CM

## 2018-12-10 RX ADMIN — Medication ONE MLS: at 14:50

## 2018-12-10 NOTE — EDPHY
H & P


Time Seen by Provider: 12/09/18 23:00


HPI/ROS: 





CLINICAL IMPRESSION: 


 Left foot cellulitis with lymphangitis





ASSESSMENT/PLAN:


This is a 38-year-old male with a history of MRSA who presents to the emergency 

department for the 2nd time today with concerns of a worsening left foot 

cellulitis.  Compared to pictures of the foot earlier today, patient does 

appear to have developed increased swelling and redness now affecting the 

entire dorsum of the foot and extending to the ankle.  Distal neurovascular 

exam is intact, cap refill intact although patient does report subjective 

paresthesias to the foot.  He also has pain with ambulation.  No reproducible 

pain to palpation on my exam of the foot or leg.  No obvious open wound.  No 

radiologic evidence for fracture, foreign body, or osteomyelitis.  Labs show no 

leukocytosis, renal insufficiency, electrolyte imbalance.  Vital signs stable, 

no tachycardia, fever, or signs of sepsis.  No clinical indication of 

necrotizing fasciitis.  Case discussed with Dr. Kruger.  Patient received an IV 

dose of vancomycin and was encouraged to follow up in the ER in 12 hr for 

recheck.  Borders of redness were outlined.  Low threshold for return to ED 

sooner as outlined in person and discharge papers.





DIFFERENTIAL DX:


 Differential diagnosis includes but not limited to cellulitis, septic joint, 

necrotizing fasciitis, osteomyelitis, deep space abscess, gout, inflammatory 

arthritis





ED PROCEDURES:


see lab and imaging results below





ED COURSE:


Case discussed with Dr. Kruger  No leukocytosis or electrolyte imbalance.  X-

rays show no evidence of foreign body, fracture, or bony lesion.  Borders of 

erythema and swelling were outlined.  Patient received a single dose of IV 

vancomycin and was encouraged to follow up in the ER in 12 hr for recheck.





CHIEF COMPLAINT:  Left foot pain redness and swelling





HPI:


This is a 38-year-old male who presents to the emergency department for the 2nd 

time in 12 hr for worsening left foot redness and swelling.  Patient was seen 

at 10:00 a.m. This morning in this ED and diagnosed with a left 5th toe 

cellulitis.  He does have a history of MRSA.  He was started on Bactrim and 

Keflex and reports he has had 2 doses of this.  He reports he has been 

elevating the leg at home.  His friend has noted that the foot has become more 

swollen, redness has spread above the ankle, and he appears to have a small red 

streak on the anterior lower leg.  He reports no fevers, chills, palpitations, 

vomiting but does feel a little nauseous.  He now reports that his foot feels 

numb and tingly and he does have pain with weight-bearing.  No reported trauma.

  He works in a tire shop and wears steel-toed boots.





PAST MEDICAL HISTORY:  Alcohol





Pertinent Past Surgical History:  None reported





Social History:  Wearing an ankle monitor, history of MRSA, works in a tire shop





REVIEW OF SYSTEMS:


All other systems negative


Constitutional:  No fever, no chills


Musculoskeletal:  No deformity, + joint pain


Skin:  Color change to left foot open wounds.


Neurological:  Sensory changes noted, no weakness.]





PHYSICAL EXAM:


General Appearance:  Alert, oriented, appropriate for age, cooperative, NAD, 

well hydrated, non-toxic appearing, VSS, no hypoxia.


Neurological:  Alert and oriented x 3, normal sensation and strength of 

extremities


Skin: Erythema noted to the dorsum of the entire left foot extending to the 

anterior ankle.  Compared to pictures from earlier today, this appears new with 

increased swelling.  Distal neurovascular exam intact.  Cap refill 2 sec.  Full 

range of motion of ankle foot and toes.  No clinical indication of septic 

joint.  Vesicles noted at the webspace between the 4th and 5th toe, no 

laceration or open wound between the toes or on the bottom of the foot.  There 

is an area of lymphangitis on the left anterior leg, no reproducible tenderness 

over this area and no calf pain.  Full range of motion of the knee.


Musculoskeletal:  See above, full range of motion of ankle foot toes and knee





MEDICAL DECISION MAKING:


Patient was seen independently.Secondary supervising physician at time of 

evaluation was  Dr Kruger.


Diagnosis:  Left foot cellulitis. New, requires workup


Summary: See assessment and plan for summary of ED visit 


Independent visualization of images, tracing, or specimens yes.


Review / Summarize previous medical records yes, reviewed ED records from 

earlier today


Discussed patient with another provider Dr. Kruger





Patient Progress  stable. 


Smoking Status: Former smoker


Constitutional: 





 Initial Vital Signs











Temperature (C)  36.3 C   12/09/18 22:51


 


Heart Rate  78   12/09/18 22:51


 


Respiratory Rate  20   12/09/18 22:51


 


Blood Pressure  115/50 L  12/09/18 22:51


 


O2 Sat (%)  97   12/09/18 22:51








 











O2 Delivery Mode               Room Air














Allergies/Adverse Reactions: 


 





No Known Allergies Allergy (Verified 12/09/18 22:51)


 








Home Medications: 














 Medication  Instructions  Recorded


 


Cephalexin [Keflex (RX)] 500 mg PO TID #20 cap 12/09/18


 


Sulfamethox/Tmp 800/160 mg 1 tab PO BID #14 tab 12/09/18





[Bactrim Ds]  














MDM/Departure





- MDM


Imaging: I viewed and interpreted images myself


Medications Given: 





 





Vancomycin/Sodium Chloride (Vancomycin 1 Gm (Premix))  250 mls @ 250 mls/hr IV 

ONCE ONE


   PRN Reason: Protocol


   Stop: 12/10/18 00:38


   Last Admin: 12/09/18 23:47 Dose:  250 mls








- Depart


Disposition: Home, Routine, Self-Care


Clinical Impression: 


 Cellulitis of left foot





Condition: Good


Instructions:  Cellulitis (ED)


Additional Instructions: 


DISCHARGE INSTRUCTIONS FROM YOUR DOCTOR 


Thank you for visiting our emergency department today. Please keep in mind that 

discharge from the emergency department does not mean that there is nothing 

wrong - it simply means that we have not identified an emergency condition that 

requires further evaluation or treatment in the hospital. You should always 

plan to follow up with primary care for re-evaluation of your condition in the 

next 2-3 days. If you have been referred to a specialist, please call as soon 

as possible (today or tomorrow) to schedule your follow up appointment at the 

appropriate time. 





X-rays of your foot show no evidence of bone infection, fracture or foreign 

body.  Labs were reassuring, you had a normal white blood count and normal 

electrolytes.  Vital signs were stable.  He received a dose of IV vancomycin.  

Continue taking oral antibiotics as prescribed.  Please return to the emergency 

department in 12 hr for recheck.  Return to ER sooner for worsening pain, 

spreading redness or swelling, inability to bear weight on the leg, pain with 

movement of the ankle, toes, foot or knee, development of fevers greater than 

100.4, vomiting, or any other concern. 





People present with illnesses and injuries in different ways, and it is always 

possible that we have missed something. You may always return for re-evaluation 

if symptoms worsen or if they are not improving or if you develop new/different 

symptoms. 


Again, thank you for choosing our emergency department. We hope that you feel 

better.


Referrals: 


NONE *[facility]IMARY [facility] P,. [Primary Care Provider] - As per Instructions


[facility],. [Clinic] - As per Instructions

## 2018-12-10 NOTE — EDPHY
H & P


Stated Complaint: l foot infection here for recheck/seen yesterday


Time Seen by Provider: 12/10/18 14:29





- Personal History


Current Tetanus Diphtheria and Acellular Pertussis (TDAP): Yes





- Medical/Surgical History


Hx Asthma: No


Hx Chronic Respiratory Disease: No


Hx Diabetes: No


Hx Cardiac Disease: No


Hx Renal Disease: No


Hx Cirrhosis: No


Hx Alcoholism: No


Hx HIV/AIDS: No


Hx Splenectomy or Spleen Trauma: No


Other PMH: tonsillectomy





- Social History


Smoking Status: Former smoker


Constitutional: 


 Initial Vital Signs











Temperature (C)  36.5 C   12/10/18 13:45


 


Heart Rate  83   12/10/18 13:45


 


Respiratory Rate  17   12/10/18 13:45


 


Blood Pressure  115/67   12/10/18 13:45


 


O2 Sat (%)  95   12/10/18 13:45








 











O2 Delivery Mode               Room Air














Allergies/Adverse Reactions: 


 





No Known Allergies Allergy (Verified 12/10/18 13:44)


 








Home Medications: 














 Medication  Instructions  Recorded


 


Cephalexin [Keflex (RX)] 500 mg PO TID #20 cap 12/09/18


 


Sulfamethox/Tmp 800/160 mg 1 tab PO BID #14 tab 12/09/18





[Bactrim Ds]  














Medical Decision Making


ED Course/Re-evaluation: 





CHIEF COMPLAINT:  Left foot cellulitis





HISTORY OF PRESENT ILLNESS:  The patient is a 39 y/o male returning for a 

recheck of his left foot cellulitis and another round of IV antibiotics. I saw 

him here yesterday for the same complaint and treated him at that time with PO 

Bactrim and Keflex. He returned later that night after redness spread up his 

leg and was treated with a dose of IV Vancomycin and advised to return today 

for recheck and additional dose of antibiotics. Redness, swelling, and pain has 

significantly improved since then. No fever or other symptoms of systemic 

illness. 





REVIEW OF SYSTEMS:  





A comprehensive 10 system review of systems is otherwise negative aside from 

elements mentioned in the history of present illness and medical decision 

making.





PHYSICAL EXAM:  





HR, BP, O2 Sat, RR.  Temp noted


General Appearance:  Alert, well hydrated, appropriate, and non-toxic appearing.


Head:  Atraumatic without scalp tenderness or obvious injury


Eyes:  Pupils equal, round, reactive to light and accommodation, EOMI, no trauma

, no injection.


Nose:  Atraumatic, no rhinorrhea, clear.


Throat:  Mucus membranes moist.


Neck:  Supple.


Respiratory:  No distress.


Cardiovascular: Good capillary refill all extremities.


Musculoskeletal:  Mild left little toe erythema with scarce redness over dorsum 

of foot, significantly improved from yesterday. Normal active ROM of all 

extremities, atraumatic.


Neurological:  Alert, appropriate, and interactive.  Nonfocal. 


Skin:  Good turgor, no nodules on palpation.





Past medical history: Skin infection


Past surgical history: Tonsillectomy


Family history: Noncontributory


Social history: Lives in [place]. Single. Employed. Former smoker. 





Prior medial records reviewed. 





DIFFERENTIAL DIAGNOSIS:   The differential diagnosis for the patient's symptoms 

included but was not limited to cellulitis, staph infection, strep infection, 

insect bite, gout.





MEDICAL DECISION MAKING:  





This is a 39 y/o male who returns for recheck of left foot cellulitis. Exam has 

improved significantly since yesterday with only slight erythema remaining over 

left little toe and dorsum of foot. No evidence of systemic illness. Plan for 

repeat dose of 1gm IV Vancomycin here and discharge on the Bactrim and Keflex 

prescribed yesterday to continue at home. Follow up instructions and return 

precautions discussed. He is comfortable with this plan.





- Data Points


Medications Given: 


 








Discontinued Medications





Vancomycin/Sodium Chloride (Vancomycin 1 Gm (Premix))  250 mls @ 250 mls/hr IV 

EDNOW ONE


   PRN Reason: Protocol


   Stop: 12/10/18 15:32


   Last Admin: 12/10/18 14:50 Dose:  250 mls








Departure





- Departure


Disposition: Home, Routine, Self-Care


Clinical Impression: 


Cellulitis


Qualifiers:


 Site of cellulitis: extremity Site of cellulitis of extremity: lower extremity 

Laterality: left Qualified Code(s): L03.116 - Cellulitis of left lower limb





Condition: Good


Instructions:  Cephalexin (By mouth), Sulfamethoxazole/Trimethoprim (By mouth), 

Cellulitis (ED)


Additional Instructions: 


1. Take Bactrim and Keflex as prescribed yesterday. Be sure to complete the 

entire prescription even if you feel completely better. 


2. You do not need to return to the ED for further IV antibiotics unless 

symptoms worsen as discussed. 


3. Follow up with your primary care provider as needed for any unimproved 

symptoms over the next few days.


Referrals: 


[facility],. [Clinic] - As per Instructions


Stand Alone Forms:  Work Excuse


Report Scribed for: Ten [place]


Report Scribed by: Valeria Grossman


Date of Report: 12/10/18


Time of Report: 14:34

## 2018-12-27 ENCOUNTER — HOSPITAL ENCOUNTER (EMERGENCY)
Dept: HOSPITAL 80 - FED | Age: 38
Discharge: HOME | End: 2018-12-27
Payer: MEDICAID

## 2018-12-27 VITALS — DIASTOLIC BLOOD PRESSURE: 66 MMHG | SYSTOLIC BLOOD PRESSURE: 121 MMHG

## 2018-12-27 DIAGNOSIS — J10.1: Primary | ICD-10-CM

## 2018-12-27 DIAGNOSIS — Z87.891: ICD-10-CM

## 2018-12-27 NOTE — EDPHY
H & P


Stated Complaint: fever, chills,cough, body aches


Time Seen by Provider: 12/27/18 07:16


HPI/ROS: 





CHIEF COMPLAINT:  Fever, myalgias





HISTORY OF PRESENT ILLNESS:  38-year-old male presents with fever and myalgias.

  Onset of moderate myalgias yesterday, associated with fever and chills and a 

productive cough.  He also has a moderate sore throat and fatigue.  He received 

a flu vaccination this year.  Tolerating oral fluids and food well.  





REVIEW OF SYSTEMS:  complete 10 point ROS reviewed and is negative except for 

the noted elements in the HPI








- Personal History


Current Tetanus Diphtheria and Acellular Pertussis (TDAP): Yes





- Medical/Surgical History


Hx Asthma: No


Hx Chronic Respiratory Disease: No


Hx Diabetes: No


Hx Cardiac Disease: No


Hx Renal Disease: No


Hx Cirrhosis: No


Hx Alcoholism: No


Hx HIV/AIDS: No


Hx Splenectomy or Spleen Trauma: No


Other PMH: tonsillectomy





- Social History


Smoking Status: Former smoker


Alcohol Use: Sober


Drug Use: None





- Physical Exam


Exam: 





General Appearance:  Alert, pleasant


Eyes:  Pupils equal and round, no conjunctival pallor or injection


ENT, Mouth:  Mucous membranes moist, pharyngeal erythema


Neck:  Normal inspection


Respiratory:  Lungs are clear to auscultation


Cardiovascular:  Regular rate and rhythm


Gastrointestinal:  Abdomen is soft and nontender


Neurological:  A&O, nonfocal, normal gait


Skin:  Warm and dry, no rash


Extremities:  Normal inspection


Psychiatric:  Mood and affect normal





Constitutional: 


 Initial Vital Signs











Temperature (C)  37.5 C   12/27/18 06:44


 


Heart Rate  119 H  12/27/18 06:44


 


Respiratory Rate  20   12/27/18 06:44


 


Blood Pressure  109/68   12/27/18 06:44


 


O2 Sat (%)  97   12/27/18 06:44








 











O2 Delivery Mode               Room Air














Allergies/Adverse Reactions: 


 





No Known Allergies Allergy (Verified 12/27/18 06:44)


 








Home Medications: 














 Medication  Instructions  Recorded


 


Oseltamivir Phosphate [Tamiflu 75 75 mg PO BID #10 cap 12/27/18





mg (RX)]  














Medical Decision Making





- Diagnostics


Imaging Results: 





CXR: NAD








Imaging: I viewed and interpreted images myself


ED Course/Re-evaluation: 





This pt presents with influenza.  He is non-toxic appearing and well hydrated.  

Safe/stable for d/c home.  Symptomatic care and warning signs discussed.  


Differential Diagnosis: 





includes though not limited to pneumonia, dehydration, sinusitis, meningitis








- Data Points


Medications Given: 


 








Discontinued Medications





Dexamethasone (Decadron)  4 mg PO EDNOW ONE


   Stop: 12/27/18 07:40


   Last Admin: 12/27/18 07:43 Dose:  4 mg


Ibuprofen (Motrin)  600 mg PO EDNOW ONE


   Stop: 12/27/18 07:30


   Last Admin: 12/27/18 07:43 Dose:  600 mg








Departure





- Departure


Disposition: Home, Routine, Self-Care


Clinical Impression: 


 Influenza A





Condition: Good


Instructions:  Influenza (ED)


Additional Instructions: 


Alternate Tylenol and ibuprofen every 3 hr for fever and pain control.


Drink plenty of fluids.


Return for worsening symptoms or any concerns.


Referrals: 


HENRY Goodson MD [Medical Doctor] - As per Instructions


Stand Alone Forms:  Work Excuse


Prescriptions: 


Oseltamivir Phosphate [Tamiflu 75 mg (RX)] 75 mg PO BID #10 cap

## 2018-12-28 ENCOUNTER — HOSPITAL ENCOUNTER (INPATIENT)
Dept: HOSPITAL 80 - FED | Age: 38
LOS: 12 days | Discharge: HOME | DRG: 720 | End: 2019-01-09
Attending: INTERNAL MEDICINE | Admitting: INTERNAL MEDICINE
Payer: MEDICAID

## 2018-12-28 DIAGNOSIS — R65.21: ICD-10-CM

## 2018-12-28 DIAGNOSIS — B00.9: ICD-10-CM

## 2018-12-28 DIAGNOSIS — A41.01: Primary | ICD-10-CM

## 2018-12-28 DIAGNOSIS — Z86.14: ICD-10-CM

## 2018-12-28 DIAGNOSIS — E87.2: ICD-10-CM

## 2018-12-28 DIAGNOSIS — J10.08: ICD-10-CM

## 2018-12-28 DIAGNOSIS — F17.200: ICD-10-CM

## 2018-12-28 DIAGNOSIS — E86.9: ICD-10-CM

## 2018-12-28 LAB
INR PPP: 1.01 (ref 0.83–1.16)
PLATELET # BLD: 283 10^3/UL (ref 150–400)
PROTHROMBIN TIME: 13.5 SEC (ref 12–15)

## 2018-12-28 PROCEDURE — G0472 HEP C SCREEN HIGH RISK/OTHER: HCPCS

## 2018-12-28 PROCEDURE — G0378 HOSPITAL OBSERVATION PER HR: HCPCS

## 2018-12-28 PROCEDURE — A9585 GADOBUTROL INJECTION: HCPCS

## 2018-12-28 RX ADMIN — ACETAMINOPHEN PRN MG: 325 TABLET ORAL at 23:53

## 2018-12-28 RX ADMIN — GABAPENTIN SCH MG: 300 CAPSULE ORAL at 20:56

## 2018-12-28 RX ADMIN — SODIUM CHLORIDE SCH MLS: 900 INJECTION, SOLUTION INTRAVENOUS at 20:56

## 2018-12-28 RX ADMIN — OSELTAMIVIR PHOSPHATE SCH MG: 75 CAPSULE ORAL at 20:56

## 2018-12-28 NOTE — PDGENHP
History and Physical





- Chief Complaint


fevers





- History of Present Illness


37yo M who was diagnosed with influenza A yesterday returns to the ED due to 

persistent fevers and worsening shortness of breath. Symptoms started 3 days ago

, had to leave work early. Has been coughing up dark brown phlegm. Some pain 

with taking deep breaths. Both his children have been sick. Had fever to 103.5 

at home today which prompted him to come back where CXR showed bilateral 

multifocal infiltrates. He was given ceftriaxone, azithromycin, and vancomycin 

for potential post-influenza bacterial pneumonia. Case discussed with ED 

physician Peter Fletcher. 





History Information





- Allergies/Home Medication List


Allergies/Adverse Reactions: 








No Known Allergies Allergy (Verified 12/27/18 06:44)


 





Home Medications: 








Gabapentin [Neurontin 300 MG (*)] 300 mg PO TID 12/28/18 [Last Taken 12/28/18]





I have personally reviewed and updated: family history, medical history, social 

history, surgical history





- Past Medical History


Additional medical history: sciatica





- Surgical History


Reports: no pertinent surgical hx





- Family History


Positive for: non-pertinent





- Social History


Smoking Status: Former smoker


Alcohol Use: None


Drug Use: Other (former non-IV drug use)


Additional social history: Lives with parents and 2 children. Works as .





Review of Systems


Review of Systems: 





ROS: 10pt was reviewed & negative except for what was stated in HPI & below





Physical Exam


Physical Exam: 

















Temp Pulse Resp BP Pulse Ox


 


 37.1 C   121 H  18   111/62   95 


 


 12/28/18 19:32  12/28/18 19:32  12/28/18 19:32  12/28/18 19:32  12/28/18 19:32




















O2 (L/minute)                  3














Constitutional: appears nourished, uncomfortable


Eyes: PERRL, anicteric sclera, EOMI


Ears, Nose, Mouth, Throat: no oral mucosal ulcers, other (erythematous 

posterior oropharynx)


Cardiovascular: no murmur, rub, or gallop, tachycardia, No edema


Respiratory: no respiratory distress, rhonchi, No expiratory wheeze


Gastrointestinal: normoactive bowel sounds, soft, non-tender abdomen, no 

palpable masses


Genitourinary: no bladder fullness, no bladder tenderness


Skin: warm, normal color, no rashes or abrasions, no fluctuance, no induration, 

No mottled


Musculoskeletal: full muscle strength, no muscle tenderness, normal joint ROM, 

no joint effusions


Neurologic: AAOx3


Psychiatric: interacting appropriately, not anxious, not encephalopathic, 

thought process linear





Lab Data & Imaging Review





 12/28/18 17:26





 12/28/18 17:50














WBC  6.85 10^3/uL (3.80-9.50)   12/28/18  17:26    


 


RBC  5.47 10^6/uL (4.40-6.38)   12/28/18  17:26    


 


Hgb  16.2 g/dL (13.7-17.5)   12/28/18  17:26    


 


Hct  48.0 % (40.0-51.0)   12/28/18  17:26    


 


MCV  87.8 fL (81.5-99.8)   12/28/18  17:26    


 


MCH  29.6 pg (27.9-34.1)   12/28/18  17:26    


 


MCHC  33.8 g/dL (32.4-36.7)   12/28/18  17:26    


 


RDW  15.1 % (11.5-15.2)   12/28/18  17:26    


 


Plt Count  283 10^3/uL (150-400)   12/28/18  17:26    


 


MPV  9.6 fL (8.7-11.7)   12/28/18  17:26    


 


Neut % (Auto)  Not Reported   12/28/18  17:26    


 


Lymph % (Auto)  Not Reported   12/28/18  17:26    


 


Mono % (Auto)  Not Reported   12/28/18  17:26    


 


Eos % (Auto)  Not Reported   12/28/18  17:26    


 


Baso % (Auto)  Not Reported   12/28/18  17:26    


 


Nucleat RBC Rel Count  Not Reported   12/28/18  17:26    


 


Absolute Neuts (auto)  Not Reported   12/28/18  17:26    


 


Absolute Lymphs (auto)  Not Reported   12/28/18  17:26    


 


Absolute Monos (auto)  Not Reported   12/28/18  17:26    


 


Absolute Eos (auto)  Not Reported   12/28/18  17:26    


 


Absolute Basos (auto)  Not Reported   12/28/18  17:26    


 


Absolute Nucleated RBC  Not Reported   12/28/18  17:26    


 


Immature Gran %  Not Reported   12/28/18  17:26    


 


Seg Neutrophils %  63.9 %  12/28/18  17:26    


 


Band Neutrophils %  14.4 %  12/28/18  17:26    


 


Lymphocytes %  14.5 %  12/28/18  17:26    


 


Monocytes %  6.2 %  12/28/18  17:26    


 


Eosinophils %  0.0 %  12/28/18  17:26    


 


Basophils %  0.0 %  12/28/18  17:26    


 


Metamyelocytes %  1.0 %  12/28/18  17:26    


 


Myelocytes %  0.0 %  12/28/18  17:26    


 


Promyelocytes %  0.0 %  12/28/18  17:26    


 


Blast Cells %  0.0 %  12/28/18  17:26    


 


Immature Gran #  Not Reported   12/28/18  17:26    


 


Absolute Seg Neuts  4.38 10^3/uL (1.70-6.50)   12/28/18  17:26    


 


Absolute Band Neuts  0.99 10^3/uL (0.00-0.70)  H  12/28/18  17:26    


 


Absolute Lymphocytes  0.99 10^3/uL (1.00-3.00)  L  12/28/18  17:26    


 


Absolute Monocytes  0.42 10^3/uL (0.30-0.80)   12/28/18  17:26    


 


Absolute Eosinophils  0.00 10^3/uL (0.03-0.40)  L  12/28/18  17:26    


 


Absolute Basophils  0.00 10^3/uL (0.02-0.10)  L  12/28/18  17:26    


 


Absolute Metamyelocyte  0.07 10^3/mL (0.00-0.00)  H  12/28/18  17:26    


 


Absolute Myelocytes  0.00 10^3/mL (0.00-0.00)   12/28/18  17:26    


 


Absolute Promyelocytes  0.00 10^3/uL (0.00-0.00)   12/28/18  17:26    


 


Absolute Plasma Cells  0.00 10^3/uL (0.00-0.00)   12/28/18  17:26    


 


Nucleated RBCs  0 /100 WBC (0-0)   12/28/18  17:26    


 


Absolute Blast Cells  0.00 10^3/uL (0.00-0.00)   12/28/18  17:26    


 


Plasma Cells %  0.0 %  12/28/18  17:26    


 


Platelet Estimate  ADEQUATE  (ADEQ)   12/28/18  17:26    


 


Oval Macrocytes  1+  H  12/28/18  17:26    


 


PT  13.5 SEC (12.0-15.0)   12/28/18  17:50    


 


INR  1.01  (0.83-1.16)   12/28/18  17:50    


 


APTT  34.0 SEC (23.0-38.0)   12/28/18  17:50    


 


VBG Lactic Acid  1.5 mmol/L (0.7-2.1)   12/28/18  17:50    


 


Sodium  135 mEq/L (135-145)   12/28/18  17:50    


 


Potassium  4.3 mEq/L (3.5-5.2)   12/28/18  17:50    


 


Chloride  108 mEq/L ()   12/28/18  17:50    


 


Carbon Dioxide  21 mEq/l (22-31)  L  12/28/18  17:50    


 


Anion Gap  6 mEq/L (6-14)   12/28/18  17:50    


 


BUN  11 mg/dL (7-23)   12/28/18  17:50    


 


Creatinine  0.9 mg/dL (0.7-1.3)   12/28/18  17:50    


 


Estimated GFR  > 60   12/28/18  17:50    


 


Glucose  102 mg/dL ()  H  12/28/18  17:50    


 


Calcium  8.9 mg/dL (8.5-10.4)   12/28/18  17:50    


 


Total Bilirubin  0.7 mg/dL (0.1-1.4)   12/28/18  17:50    








Visualized and Interpreted Chest x-ray results: Yes


Visualized and Interpreted imaging results: Yes


Interpretation: CXR: bilateral multifocal airspace opacities, no effusion





Assessment & Plan


Assessment: 


37yo M who was diagnosed with influenza A yesterday returns to the ED due to 

persistent fevers and worsening shortness of breath found to have multifocal 

pneumonia.


Plan: 





1. Sepsis: Fever, tachycardia with pulmonary source. BP stable, lactate ok.


   - Antibiotics as below, follow blood cultures, maintenance IVF





2. Multifocal pneumonia: Bilateral. Either sequela of influenza vs superimposed 

bacterial process. 


   - Check procalcitonin


   - Ceftriaxone and azithromycin. If not improving, consider MRSA coverage





3. Influenza A: Diagnosed yesterday


   - Continue tamiflu, supportive/symptomatic care





4. Acute hypoxemic respiratory insufficiency: Requiring 2-3L. Due to pneumonia.


   - Wean as able





5. Sciatica: Continue home gabapentin.





VTE ppx: LMWH


Code: full


Diet: regular


Dispo: Admit under observation

## 2018-12-28 NOTE — EDPHY
H & P


Time Seen by Provider: 12/28/18 17:14


HPI/ROS: 





CHIEF COMPLAINT:  Hard time breathing





HISTORY OF PRESENT ILLNESS:  Patient was diagnosed with influenza a yesterday.  

Last 24 hr he states that he has had persistent high fevers associated with 

sore throat and headache and hard time breathing.  He says his chest hurts in 

his back hurts and symptoms are worse with exertion.  Associated with a slight 

cough but not productive of sputum.  No neck stiffness, no leg swelling.  

Symptoms moderate.  Took 200 mg ibuprofen at 4:00 p.m..





REVIEW OF SYSTEMS:


Eye: no change in vision


ENT:  HPI no earache, mild sore throat


Cardiac:  HPI no palpitations


Pulmonary:  HPI


Abdomen: no vomiting, diarrhea, abdominal pain


Musculoskeletal:  HPI


Skin: no rash


Neuro:  HPI


Constitutional:  HPI


: no urinary symptoms





A comprehensive 10 point review of systems is otherwise negative aside from 

elements mentioned in the history of present illness.





PAST MEDICAL HISTORY:  Negative except for tonsillectomy





Social history:  Nonsmoker, no drugs





General Appearance: Alert and conversant, cooperative.


Eyes: No scleral  icterus. 


ENT, Mouth:  Slightly dry mucous membranes.


Respiratory:  Decreased breath sounds bilaterally with slight expiratory wheeze

, no focal lung sounds.  Speaks in full sentences.


Cardiovascular:  Regular rate and rhythm.  Tachycardic.


Gastrointestinal:  Abdomen is soft and non tender.


Neurological: Alert, face symmetric, normal motor and sensory in extremities. 


Skin: Warm and dry, no rashes.  No petechiae or purpura.


Musculoskeletal: No neck stiffness, normal ROM.


Psychiatric: Not agitated.





Emergency Department course/MDM:





Noted to be febrile and tachycardic.  1 g Tylenol, normal saline 2 L, chest x-

ray and DuoNeb ordered.





1753:  Bilateral pneumonia on chest x-ray discussed with the patient.  Blood 

cultures and respiratory panel, lactate screening, sepsis fluid bolus, will 

antibiotics to include ceftriaxone azithromycin and vancomycin for possible 

post flu influenza staph pneumonia.





Admission to hospitalist service; lactate 1.5 does not have septic shock or 

severe sepsis at this time.


Smoking Status: Former smoker


Constitutional: 


 Initial Vital Signs











Temperature (C)  37.9 C   12/28/18 16:38


 


Heart Rate  128 H  12/28/18 16:38


 


Respiratory Rate  20   12/28/18 16:38


 


Blood Pressure  124/57 H  12/28/18 16:38


 


O2 Sat (%)  97   12/28/18 16:38








 











O2 Delivery Mode               Room Air














Allergies/Adverse Reactions: 


 





No Known Allergies Allergy (Verified 12/27/18 06:44)


 








Home Medications: 














 Medication  Instructions  Recorded


 


Oseltamivir Phosphate [Tamiflu 75 75 mg PO BID #10 cap 12/27/18





mg (RX)]  


 


Gabapentin [Neurontin 300 MG (*)] 300 mg PO TID 12/28/18














Medical Decision Making





- Diagnostics


Imaging Results: 


 Imaging Impressions





Chest X-Ray  12/28/18 17:26


Impression:  Bilateral pneumonia.


 


Findings and recommendations discussed with Emergency Department physician, 

Peter Fletcher M.D., at 1752 hours, on December 28, 2018.


 


Final report concurs with initial preliminary interpretation.








Bilateral infiltrates, pneumonia.


Imaging: I viewed and interpreted images myself


Differential Diagnosis: 





Differential diagnosis considered for shortness of breath including but not 

limited to pulmonary infectious process, COPD, asthma, pulmonary embolus and 

congestive heart failure.


Consult/Admit Bed Type: [place] [id number]


Critical Care Time: 





Critical care time spent by me, Dr. Fletcher, exclusively with the care of this 

patient was 30 minutes, exclusive of PA or NP time and exclusive of separate 

procedures.  The organ system at risk was pulmonary and I ordered supplemental 

oxygen and nebulizer treatments, multiple antibiotics, multiple diagnostics, IV 

fluids and admission to stabilize the patient and prevent worsening of the 

patient's condition. 





- Data Points


Laboratory Results: 


 Laboratory Results





 12/28/18 17:26 





 12/28/18 17:50 





 











  12/28/18 12/28/18 12/28/18





  17:50 17:50 17:50


 


WBC      





    


 


RBC      





    


 


Hgb      





    


 


Hct      





    


 


MCV      





    


 


MCH      





    


 


MCHC      





    


 


RDW      





    


 


Plt Count      





    


 


MPV      





    


 


Neut % (Auto)      





    


 


Lymph % (Auto)      





    


 


Mono % (Auto)      





    


 


Eos % (Auto)      





    


 


Baso % (Auto)      





    


 


Nucleat RBC Rel Count      





    


 


Absolute Neuts (auto)      





    


 


Absolute Lymphs (auto)      





    


 


Absolute Monos (auto)      





    


 


Absolute Eos (auto)      





    


 


Absolute Basos (auto)      





    


 


Absolute Nucleated RBC      





    


 


Immature Gran %      





    


 


Seg Neutrophils %      





    


 


Band Neutrophils %      





    


 


Lymphocytes %      





    


 


Monocytes %      





    


 


Eosinophils %      





    


 


Basophils %      





    


 


Metamyelocytes %      





    


 


Myelocytes %      





    


 


Promyelocytes %      





    


 


Blast Cells %      





    


 


Immature Gran #      





    


 


Absolute Seg Neuts      





    


 


Absolute Band Neuts      





    


 


Absolute Lymphocytes      





    


 


Absolute Monocytes      





    


 


Absolute Eosinophils      





    


 


Absolute Basophils      





    


 


Absolute Metamyelocyte      





    


 


Absolute Myelocytes      





    


 


Absolute Promyelocytes      





    


 


Absolute Plasma Cells      





    


 


Nucleated RBCs      





    


 


Absolute Blast Cells      





    


 


Plasma Cells %      





    


 


Platelet Estimate      





    


 


Oval Macrocytes      





    


 


PT  13.5 SEC SEC    





   (12.0-15.0)   


 


INR  1.01     





   (0.83-1.16)   


 


APTT  34.0 SEC SEC    





   (23.0-38.0)   


 


VBG Lactic Acid    1.5 mmol/L mmol/L  





    (0.7-2.1)  


 


Sodium      135 mEq/L mEq/L





     (135-145) 


 


Potassium      4.3 mEq/L mEq/L





     (3.5-5.2) 


 


Chloride      108 mEq/L mEq/L





     () 


 


Carbon Dioxide      21 mEq/l L mEq/l





     (22-31) 


 


Anion Gap      6 mEq/L mEq/L





     (6-14) 


 


BUN      11 mg/dL mg/dL





     (7-23) 


 


Creatinine      0.9 mg/dL mg/dL





     (0.7-1.3) 


 


Estimated GFR      > 60 





    


 


Glucose      102 mg/dL H mg/dL





     () 


 


Calcium      8.9 mg/dL mg/dL





     (8.5-10.4) 


 


Total Bilirubin      0.7 mg/dL mg/dL





     (0.1-1.4) 














  12/28/18





  17:26


 


WBC  6.85 10^3/uL 10^3/uL





   (3.80-9.50) 


 


RBC  5.47 10^6/uL 10^6/uL





   (4.40-6.38) 


 


Hgb  16.2 g/dL g/dL





   (13.7-17.5) 


 


Hct  48.0 % %





   (40.0-51.0) 


 


MCV  87.8 fL fL





   (81.5-99.8) 


 


MCH  29.6 pg pg





   (27.9-34.1) 


 


MCHC  33.8 g/dL g/dL





   (32.4-36.7) 


 


RDW  15.1 % %





   (11.5-15.2) 


 


Plt Count  283 10^3/uL 10^3/uL





   (150-400) 


 


MPV  9.6 fL fL





   (8.7-11.7) 


 


Neut % (Auto)  Not Reported 





  


 


Lymph % (Auto)  Not Reported 





  


 


Mono % (Auto)  Not Reported 





  


 


Eos % (Auto)  Not Reported 





  


 


Baso % (Auto)  Not Reported 





  


 


Nucleat RBC Rel Count  Not Reported 





  


 


Absolute Neuts (auto)  Not Reported 





  


 


Absolute Lymphs (auto)  Not Reported 





  


 


Absolute Monos (auto)  Not Reported 





  


 


Absolute Eos (auto)  Not Reported 





  


 


Absolute Basos (auto)  Not Reported 





  


 


Absolute Nucleated RBC  Not Reported 





  


 


Immature Gran %  Not Reported 





  


 


Seg Neutrophils %  63.9 % %





  


 


Band Neutrophils %  14.4 % %





  


 


Lymphocytes %  14.5 % %





  


 


Monocytes %  6.2 % %





  


 


Eosinophils %  0.0 % %





  


 


Basophils %  0.0 % %





  


 


Metamyelocytes %  1.0 % %





  


 


Myelocytes %  0.0 % %





  


 


Promyelocytes %  0.0 % %





  


 


Blast Cells %  0.0 % %





  


 


Immature Gran #  Not Reported 





  


 


Absolute Seg Neuts  4.38 10^3/uL 10^3/uL





   (1.70-6.50) 


 


Absolute Band Neuts  0.99 10^3/uL H 10^3/uL





   (0.00-0.70) 


 


Absolute Lymphocytes  0.99 10^3/uL L 10^3/uL





   (1.00-3.00) 


 


Absolute Monocytes  0.42 10^3/uL 10^3/uL





   (0.30-0.80) 


 


Absolute Eosinophils  0.00 10^3/uL L 10^3/uL





   (0.03-0.40) 


 


Absolute Basophils  0.00 10^3/uL L 10^3/uL





   (0.02-0.10) 


 


Absolute Metamyelocyte  0.07 10^3/mL H 10^3/mL





   (0.00-0.00) 


 


Absolute Myelocytes  0.00 10^3/mL 10^3/mL





   (0.00-0.00) 


 


Absolute Promyelocytes  0.00 10^3/uL 10^3/uL





   (0.00-0.00) 


 


Absolute Plasma Cells  0.00 10^3/uL 10^3/uL





   (0.00-0.00) 


 


Nucleated RBCs  0 /100 WBC /100 WBC





   (0-0) 


 


Absolute Blast Cells  0.00 10^3/uL 10^3/uL





   (0.00-0.00) 


 


Plasma Cells %  0.0 % %





  


 


Platelet Estimate  ADEQUATE 





   (ADEQ) 


 


Oval Macrocytes  1+  H 





  


 


PT  





  


 


INR  





  


 


APTT  





  


 


VBG Lactic Acid  





  


 


Sodium  





  


 


Potassium  





  


 


Chloride  





  


 


Carbon Dioxide  





  


 


Anion Gap  





  


 


BUN  





  


 


Creatinine  





  


 


Estimated GFR  





  


 


Glucose  





  


 


Calcium  





  


 


Total Bilirubin  





  











Medications Given: 


 








Discontinued Medications





Acetaminophen (Tylenol)  1,000 mg PO EDNOW ONE


   Stop: 12/28/18 17:18


   Last Admin: 12/28/18 17:19 Dose:  1,000 mg


Albuterol/Ipratropium (Duoneb)  3 ml IH EDNOW ONE


   Stop: 12/28/18 17:27


   Last Admin: 12/28/18 17:30 Dose:  3 ml


Sodium Chloride (Ns)  1,000 mls @ 0 mls/hr IV ONCE ONE; Wide Open


   PRN Reason: Protocol


   Stop: 12/28/18 17:27


   Last Admin: 12/28/18 17:49 Dose:  1,000 mls


Sodium Chloride (Ns)  1,000 mls @ 0 mls/hr IV EDNOW ONE; Wide Open


   PRN Reason: Protocol


   Stop: 12/28/18 17:27


   Last Admin: 12/28/18 17:49 Dose:  1,000 mls


Azithromycin 500 mg/ Dextrose  255 mls @ 255 mls/hr IV EDNOW ONE


   PRN Reason: Protocol


   Stop: 12/28/18 18:51


   Last Admin: 12/28/18 19:30 Dose:  255 mls


Ceftriaxone Sodium/Dextrose (Rocephin 1 Gm (Premix))  50 mls @ 100 mls/hr IV 

EDNOW ONE


   PRN Reason: Protocol


   Stop: 12/28/18 18:21


   Last Admin: 12/28/18 18:15 Dose:  50 mls


Sodium Chloride (Ns)  2,400 mls @ 4,800 mls/hr 30 ml/kg infuse over 30 min (

2400 ml) IV EDNOW ONE


   PRN Reason: Protocol


   Stop: 12/28/18 18:21


   Last Admin: 12/28/18 18:27 Dose:  2,400 mls


Vancomycin/Sodium Chloride (Vancomycin 1 Gm (Premix))  250 mls @ 250 mls/hr IV 

EDNOW ONE


   PRN Reason: Protocol


   Stop: 12/28/18 18:51


   Last Admin: 12/28/18 18:20 Dose:  250 mls


Ibuprofen (Motrin)  600 mg PO EDNOW ONE


   Stop: 12/28/18 18:53


   Last Admin: 12/28/18 19:07 Dose:  600 mg


Morphine Sulfate (Morphine)  4 mg IVP EDNOW ONE


   Stop: 12/28/18 19:44


   Last Admin: 12/28/18 19:49 Dose:  4 mg


Ondansetron HCl (Zofran)  4 mg IVP EDNOW ONE


   Stop: 12/28/18 19:44


   Last Admin: 12/28/18 19:48 Dose:  4 mg








Departure





- Departure


Disposition: [facility]


Clinical Impression: 


 Influenza





Pneumonia


Qualifiers:


 Pneumonia type: due to unspecified organism Laterality: bilateral Lung location

: unspecified part of lung Qualified Code(s): J18.9 - Pneumonia, unspecified 

organism





Condition: Serious

## 2018-12-29 LAB
INR PPP: 1.38 (ref 0.83–1.16)
PLATELET # BLD: 271 10^3/UL (ref 150–400)
PROTHROMBIN TIME: 17.1 SEC (ref 12–15)

## 2018-12-29 PROCEDURE — 05H633Z INSERTION OF INFUSION DEVICE INTO LEFT SUBCLAVIAN VEIN, PERCUTANEOUS APPROACH: ICD-10-PCS | Performed by: SURGERY

## 2018-12-29 RX ADMIN — NOREPINEPHRINE BITARTRATE SCH MLS: 1 INJECTION, SOLUTION, CONCENTRATE INTRAVENOUS at 11:22

## 2018-12-29 RX ADMIN — LINEZOLID SCH MLS: 600 INJECTION, SOLUTION INTRAVENOUS at 09:39

## 2018-12-29 RX ADMIN — LINEZOLID SCH MLS: 600 INJECTION, SOLUTION INTRAVENOUS at 21:25

## 2018-12-29 RX ADMIN — ASCORBIC ACID SCH MLS: 500 INJECTION, SOLUTION INTRAMUSCULAR; INTRAVENOUS; SUBCUTANEOUS at 08:39

## 2018-12-29 RX ADMIN — HYDROCORTISONE SODIUM SUCCINATE SCH MG: 100 INJECTION, POWDER, FOR SOLUTION INTRAMUSCULAR; INTRAVENOUS at 17:20

## 2018-12-29 RX ADMIN — ASCORBIC ACID SCH MLS: 500 INJECTION, SOLUTION INTRAMUSCULAR; INTRAVENOUS; SUBCUTANEOUS at 21:25

## 2018-12-29 RX ADMIN — THIAMINE HYDROCHLORIDE SCH MLS: 100 INJECTION, SOLUTION INTRAMUSCULAR; INTRAVENOUS at 21:25

## 2018-12-29 RX ADMIN — GABAPENTIN SCH MG: 300 CAPSULE ORAL at 15:52

## 2018-12-29 RX ADMIN — KETOROLAC TROMETHAMINE PRN MG: 30 INJECTION, SOLUTION INTRAMUSCULAR at 12:20

## 2018-12-29 RX ADMIN — THIAMINE HYDROCHLORIDE SCH MLS: 100 INJECTION, SOLUTION INTRAMUSCULAR; INTRAVENOUS at 08:55

## 2018-12-29 RX ADMIN — ENOXAPARIN SODIUM SCH MG: 100 INJECTION SUBCUTANEOUS at 10:44

## 2018-12-29 RX ADMIN — CLOTRIMAZOLE SCH APP: 10 CREAM TOPICAL at 08:55

## 2018-12-29 RX ADMIN — CLOTRIMAZOLE SCH APP: 10 CREAM TOPICAL at 21:26

## 2018-12-29 RX ADMIN — VASOPRESSIN SCH MLS: 20 INJECTION, SOLUTION INTRAMUSCULAR; SUBCUTANEOUS at 21:25

## 2018-12-29 RX ADMIN — GABAPENTIN SCH MG: 300 CAPSULE ORAL at 21:25

## 2018-12-29 RX ADMIN — HYDROCORTISONE SODIUM SUCCINATE SCH MG: 100 INJECTION, POWDER, FOR SOLUTION INTRAMUSCULAR; INTRAVENOUS at 12:08

## 2018-12-29 RX ADMIN — OSELTAMIVIR PHOSPHATE SCH MG: 75 CAPSULE ORAL at 10:45

## 2018-12-29 RX ADMIN — NOREPINEPHRINE BITARTRATE SCH MLS: 1 INJECTION, SOLUTION, CONCENTRATE INTRAVENOUS at 01:25

## 2018-12-29 RX ADMIN — VASOPRESSIN SCH MLS: 20 INJECTION, SOLUTION INTRAMUSCULAR; SUBCUTANEOUS at 12:37

## 2018-12-29 RX ADMIN — GABAPENTIN SCH MG: 300 CAPSULE ORAL at 10:45

## 2018-12-29 RX ADMIN — SODIUM CHLORIDE SCH MLS: 900 INJECTION, SOLUTION INTRAVENOUS at 01:15

## 2018-12-29 RX ADMIN — OSELTAMIVIR PHOSPHATE SCH MG: 75 CAPSULE ORAL at 21:26

## 2018-12-29 RX ADMIN — ACETAMINOPHEN PRN MG: 325 TABLET ORAL at 21:39

## 2018-12-29 RX ADMIN — NOREPINEPHRINE BITARTRATE SCH MLS: 1 INJECTION, SOLUTION, CONCENTRATE INTRAVENOUS at 09:16

## 2018-12-29 RX ADMIN — ACETAMINOPHEN PRN MG: 325 TABLET ORAL at 10:45

## 2018-12-29 RX ADMIN — NOREPINEPHRINE BITARTRATE SCH MLS: 1 INJECTION, SOLUTION, CONCENTRATE INTRAVENOUS at 05:58

## 2018-12-29 RX ADMIN — OXYCODONE HYDROCHLORIDE PRN MG: 15 TABLET ORAL at 07:22

## 2018-12-29 RX ADMIN — ASCORBIC ACID SCH MLS: 500 INJECTION, SOLUTION INTRAMUSCULAR; INTRAVENOUS; SUBCUTANEOUS at 15:04

## 2018-12-29 RX ADMIN — VASOPRESSIN SCH MLS: 20 INJECTION, SOLUTION INTRAMUSCULAR; SUBCUTANEOUS at 02:22

## 2018-12-29 RX ADMIN — FLUDROCORTISONE ACETATE SCH MG: 0.1 TABLET ORAL at 10:49

## 2018-12-29 NOTE — GCON
INFECTIOUS DISEASE CONSULT



DATE OF CONSULTATION:  2018



PERSON REQUESTING CONSULT:  Umair Will MD.



REASON FOR CONSULT:  To assist in the management of this 38-year-old male with 
influenza A, sepsis, and respiratory failure.



HISTORY OF PRESENT ILLNESS: 

The patient is a 38-year-old male whose previous medical history is notable for 
the followin.  Substance use disorder:  The patient states that he has a history of 
smoking methamphetamines as well as snorting them.  He states that he recently 
got out of [facility], where he was for 5 months for assault.  He has been clean from 
all substances for 7 months.  He states he has never used intravenous substances
, but used to abuse steroids, injecting them into his buttocks in the distant 
past as well.  The patient presently has a right ankle bracelet in place, which 
he states should be removed next week when he will be off parole.

2.  History of MRSA skin and soft tissue infection:  The patient states he was 
living in [place], [place], and in , developed a subcutaneous abscess in 
his left anterior chest that was drained and positive for MRSA.

3.  History of tobacco use disorder:  Patient smoked for 10 years and quit in 
.

4.  History of sciatica.

5.  Influenza B, 2018. 



Regarding his present issues, the patient presented originally to [facility] on , complaining of fever and myalgias.  He had a 
temperature of 37.5.  He was diagnosed with influenza A and started on Tamiflu.
  A chest x-ray, at that time, showed no obvious infiltrate.  The patient 
continued to do unwell at home with fevers as high as almost 104 degrees.  He re
-presented to [facility] on , in 
the evening.  Chest x-ray showed multifocal pneumonia, which was corroborated 
by a chest CT.  He had a temperature to 37.9, with an oxygen saturation of 97%.
  He was given ceftriaxone 1 g, 1 g of vancomycin, azithromycin 500 mg, and 
admitted to the floor.  Unfortunately, the patient decompensated early in the 
morning with decreasing blood pressure and increasing heart rate and was 
brought to the intensive care unit.  A left subclavian was placed by Dr. Porras, 
and pressors were started.  The patient is presently on a maximum amount of 
vasopressin and Levophed.  I am now asked to assist in his management.  
Antibiotics were broadened to include vancomycin and Zosyn as well as 
azithromycin. 



Speaking with the patient today, it is remarkable that he is not tachypneic at 
all.  He states that he is coughing and bringing up bloody sputum.  He does 
have a mild headache.  Mild sore throat.  It is uncomfortable for him to take a 
deep breath.  He denies any visual disturbance, confusion, rash, nausea, 
vomiting, or other.



REVIEW OF SYSTEMS:  Aside from what is listed above, ten systems are reviewed 
and all are negative.



PAST MEDICAL HISTORY:  As outlined above.  The patient was recently seen in 
[facility] in early December of this year for a left pinky toe 
cellulitis, treated with 7 days of Keflex and Bactrim.



ALLERGIES:  No known drug allergies.



SOCIAL HISTORY:  The patient works at [employer], changing [employer].  History of 
tobacco use disorder as outlined above, none since .  History of substance 
use disorder as outlined above, none x7 months.  Recently spent 5 months at 
[facility] as outlined above, got out on .  Has also spent 5 
years [place] from [id number].  Of note, patient was tested for HIV while in 
[facility] recently and was negative.  He denies alcohol at all.  He does have a hot 
tub at home, but it is broken.  He does spend time in the [facility] 
hot tub frequently.  No recent travel within or outside the [place].  He 
did not get the flu vaccine this year, nor did his children.  Of note, the 
patient's wife  last year related to complications from influenza.  He 
states she was an alcoholic. 

He has a dog, who is healthy.  Since getting out of [facility], the patient has had 
sex twice with the same woman without a condom, and tells me "I don't trust her.
"  He presently lives in [place] with his parents and children.



FAMILY HISTORY:  The patient is adopted and does not know anything about his 
parents' health.



PHYSICAL EXAM:  VITALS:  T current is 37, T-max is 37.8, heart rate 128, blood 
pressure 96/69, 100 percent on 4 L.  GENERAL:  Well-nourished, well-developed 
young man, lying in bed, does not appear toxic.  HEENT:  Atraumatic, 
normocephalic.  Pupils equal, round, reactive to light.  Extraocular movements 
are intact.  No conjunctival injection.  No icterus, petechiae.  Mucous 
membranes moist.  No sinus process tenderness or discharge from his nares.  
Dentition in fair repair.  No oral lesions or thrush.  No exudate.  Trachea is 
midline.  No cervical or supraclavicular lymphadenopathy.  CARDIOVASCULAR:  
Tachycardic, S1 and S2.  No rubs, gallops, or murmurs audible.  LUNGS:  Poor 
inspiratory effort.  Crackles heard in the lung bases that are fine, some 
coarse breath sounds upper airway.  No increased respiratory effort.  ABDOMEN:  
Soft.  No organomegaly, tenderness to palpation.  :  Circumcised phallus.  No 
lesions noted.  EXTREMITIES:  No clubbing, cyanosis, or edema.  His left pinky 
toe is notable for some tinea pedis between the web spaces of his 4th and 5th 
toes.  No obvious evidence of cellulitis.  SKIN:  Also notable for some tattoos 
on his back, no evidence of skin and soft tissue infection.  NEUROLOGIC:  He is 
alert and orient x3.



LABORATORY DATA:  Blood cultures x2 are pending.  Influenza PCR positive for 
flu A.  White blood cell count of 9.2, hematocrit 39.9, platelet count of 271.  
CBC last night shows 14% bands.  Venous lactate of 3.1.  Bicarbonate is 21, BUN 
and creatinine 11/0.9.  Liver function tests are normal.  Albumin of 2.5.  
Procalcitonin 1.79.



RADIOGRAPHIC DATA:  As outlined above.



IMPRESSION:  

38-year-old otherwise healthy male with distant history of methicillin 
resistant staphylococcus aureus skin and soft tissue infection and recent 5-
month incarceration, who now presents with severe influenza A with sepsis and 
multilobar pneumonia.  Incredibly, the patient looks markedly better than one 
would expect with his present physiologic and laboratory values.





PLAN:  

1.  Discontinue vancomycin, as the patient will likely hypermetabolize this 
medication given his youth; obtaining proper levels will be difficult.

2.  Start linezolid 600 mg IV q.12 hours.

3.  Discontinue Zosyn, as a pseudomonal pneumonia seems less likely, and there 
is no history of aspiration.

4.  Start Ceftriaxone 2 g IV daily.

5.  Discontinue azithromycin and start levofloxacin 750 mg IV daily given 
recent hot tub exposure and severity of illness.

6.  Will obtain sputum Gram stain and culture and urine Legionella antigen.

7.  For completeness,  will also order HIV antibody test, although the patient 
reports this was negative in [facility].  We will also check RPR, and urine GC, 
chlamydia, and hepatitis C antibody with the history of previous intranasal 
methamphetamine use. 

8. Continue Tamiflu 75 mg twice daily.  Droplet precautions in place.  



Over 70 minutes was spent with this patient today.  Thank you very much for 
consulting Infectious Diseases.  We will continue to follow this patient with 
you.





Job #:  [id number]

MTDD

## 2018-12-29 NOTE — GCON
DATE OF CONSULTATION:  12/29/2018



REASON FOR ADMISSION:  Sepsis.



HISTORY OF PRESENT ILLNESS:  38-year-old healthy male admitted with sepsis 
syndrome secondary to severe influenza.  He has developed progressive 
hypotension, tachycardia, and shortness of breath throughout his course.  
Surgery has been requested for central access placement.



PAST MEDICAL HISTORY:  Neuropathy.



PAST SURGICAL HISTORY:  Tonsillectomy.



HOME MEDICATIONS:  Neurontin, Tamiflu.



SOCIAL HISTORY:  Noncontributory.



PHYSICAL EXAM:  GENERAL:  Patient is alert, appropriate, uncomfortable.  Mildly 
labored breathing.  HEART:  Regular.  LUNGS: Coarse bilaterally, diminished 
throughout.  ABDOMEN:  Soft, nontender.  EXTREMITIES:  Unremarkable.



IMPRESSION:  Bilateral pneumonia/sepsis syndrome.



PLAN:  Central line placement for further venous access and treatment.





Job #:  [id number]

MTDD

## 2018-12-29 NOTE — PDCONSULT
Consultant Note: 





ASSESSMENT


38-year-old male with history of recent incarceration admitted with influenza 

pneumonia and severe septic shock





# septic shock. severe, requiring 2 vasopressors and steroids


# influenza pneumonia. Imaging concerning for staph. Pt has h/o MRSA infection. 


# acute hypoxemic resp failure. despite severe illness and critical condition, 

patient is currently compensated on supplemental oxygen and does not need 

emergent intubation


# sinus tachycardia, compensatory


# lactic acidosis, has not cleared despite aggressive resuscitation. Soft abd, 

no e/o extrapulmonary source


# illicit drug use


# recent incarceration





PLAN


# abx per ID


# TTE to r/o vegitations and RV failure


# stop IVF as patient has received more than enough and increasing amounts may 

increase respiratory failure and kidney injury


# vasopressin norepinephrine


# will add nghia next


# hydrocortisone 50 q.6h fludrocortisone 0.1 mg daily


# vitamin-C and thiamine


# trend lactates


# closely monitor respiratory status.  High likelihood of needing intubation 

but will hold off at this point


# Feeding - NPO


# Analgesia APAP, oxy


# Sedation none


# Thromboprophylaxis - SQ hep


# Head of bed elevated


# Ulcer prophylaxis - NA


# Glucose SSI


# Skin no skin breakdown


# Delirium - delirium precautions





Patient is critical ill due to life threatening organ dysfunction and is at 

high risk for decompensation and death. 





**Total critical care time, excluding procedures: 128 min which was spent 

evaluating interviewing patient at bedside, reviewing patient's chart as well 

as imaging, discussing with subspecialists and re-evaluating patient and 

adjusting vaso active medications





ABX





EVENTS


10/27/18 intubation, bronchoscopy





CX Data





IMAGING


I personally reviewed interpreted patient's radiographic images well as formal 

radiology reads.


2018 CT chest dense patchy multifocal infiltrates


2018 TTE mild to moderately reduced LVEF





CONSULT


I was was asked by Dr Will of Hospital Medicine to evaluate this patient 

for septic shock and severe influenza related pneumonia





Chief complaint


Shortness of breath and fatigue





HPI


Patient is a very pleasant 38 year male who initially presented [facility] on 2018 complaining fevers 

myalgias.  The time he had a temperature of 37.5 degrees he was diagnosed with 

influenza a and started on Tamiflu and sent home.  However home he continued to 

feel poorly and his condition progressed to a temperature home 140 degrees C. 

He re-presented summer 2018 with worsening symptoms and chest x-ray with 

multifocal pneumonia and a CT chest with dense multifocal infiltrates.  He was 

initiated on antibiotics and admitted to the floor.  On the floor he for the 

decompensated with increasing tachypnea heart rate and decreasing blood 

pressure despite had aggressive IV fluid resuscitation.  A left subclavian 

central line was placed by Dr. Porras and vasopressors were started.  When I 

initially met patient this morning he was in septic shock despite moderate dose 

vasopressors with inadequate maps.  We rapidly uptitrated  to max doses of 

vasopressin norepinephrine, added hydrocortisone fludrocortisone, vitamin-C and 

thiamine. We stopped IVF given adequate resuscitation marginal respiratory 

status.








Allergies


No known drug allergies





Past medical history


Polysubstance abuse including inhaled methamphetamines, history of MRSA skin 

infections, tobacco use disorder, peripheral neuropathy, sciatica, influenza





Social history


Has been incarcerated multiple times both for drugs an assault.  History of 

tobacco use.  Ex-girlfriend  of influenza in the setting of methamphetamine 

and alcohol use.  Lives up [facility] with his father and 2 children  





Family history


No blood related history of severe influenza infection





Review of systems


A comprehensive 10 point review of systems was obtained is negative except as 

per HPI





Physical exam


Vitals temperature 39.2 degrees, heart rate 145, blood pressure 80/40, 

respirations 30 98% on 6 L


GEN:  Moderate to severe distress lying in bed


NEURO: A&Ox3, CN 2-12 GI


HEENT: PERRL, EOMI, MMM, OP clear


NECK: supple, trachea midline


CHEST normal shape, no pes excavatum


CVS:  Tachy no m/r/g


PULM:  Coarse breath sounds bilaterally, tachypnea no accessory muscle use


ABD: soft, NT, ND, NABS


EXT: no swelling, no cyanosis, full ROM


SKIN: warm, dry, intact, no rash


PSYCH CAM negative, appropriate affect





Labs reviewed

## 2018-12-29 NOTE — HOSPPROG
Hospitalist Progress Note


Assessment/Plan: 








Hospitalist night float note





Patient with increasing heart rate to the 130s and declining blood pressures 

over the course of the evening despite IV fluid bolus.  SBP in the 70s.


Patient subsequently complained of severe crushing chest pain with radiation to 

his back.  EKG was obtained showing sinus tach in the 130s.  No acute ST 

changes.


Patient sent for CT with contrast that is negative for any central PEs but is 

consistent with a pneumonia.





Patient transferred to the ICU.


Dr. Porras consulted for central line access.


Discussed risks benefits with the patient and he consented, signed in chart.





Patient will be started on Levophed.





Objective: 


 Vital Signs











Temp Pulse Resp BP Pulse Ox


 


 37.6 C   125 H  25 H  87/50 L  96 


 


 12/28/18 23:39  12/28/18 23:39  12/28/18 23:39  12/28/18 23:39  12/28/18 23:39








 











 12/27/18 12/28/18 12/29/18





 05:59 05:59 05:59


 


Intake Total   2700


 


Output Total   625


 


Balance   2075








 











PT  13.5 SEC (12.0-15.0)   12/28/18  17:50    


 


INR  1.01  (0.83-1.16)   12/28/18  17:50    














ICD10 Worksheet


Patient Problems: 


 Problems











Problem Status Onset


 


Influenza Acute  


 


Pneumonia Acute

## 2018-12-29 NOTE — ECHO
[email or web address]





[address] [address] 

Main: [phone] 



Fax: 



Transthoracic Echocardiogram 

Name:             STARR RAO                           MR#:

[id number]

Study Date:       2018                             Study Time:

09:21 AM

YOB: 1980                             Age:

38 year(s)

Height:           182.9 cm (72 in.)                      Weight:

81.19 kg (179 lb.)

BSA:              2.03 m2                                Gender:

Male

Examination:      Echo                                   Indication:

Hypotension, Cardiogenic shock

Image Quality:                                           Contrast: 

Requested by:     Lali Vargas                           BP:

114 mmHg/76 mmHg

Heart Rate:                                              Rhythm:

Tachycardia

Indication:       Hypotension, Cardiogenic shock 



Procedure Staff 

Ultrasound Technician:   Hank Porter RDCS 

Reading Physician:       Shannon Valerio MD 

Requesting Provider: 



Conclusions:          Normal size left ventricle.  

No LV hypertrophy.  

Mildly reduced systolic LV function.  

The ejection fraction is visually estimated to be 45 %.  

No regional wall motion abnormality.  

Mildly dilated right ventricle.  

Mildly reduced RV function.  

Mild mitral valve regurgitation is present.  

Trivial tricuspid valve regurgitation.  

The pulmonary artery pressure is normal.  

No pericardial effusion.  

Tachycardia at 118 bpm. There is no previous exam. . 



Measurements: 

Chambers                    Valvular Assessment AV/MV

Valvular Assessment TV/PV



Normal                                   Normal

Normal

Name         Value     Range              Name         Value Range

Name           Value Range

Ao Magdalene (MM): 2.6 cm    (2.2 cm-3.7            MV E Vmax:   0.63 m/s (

- )        TR Vmax:       2.74 mm/s ( - )



cm)                MV A Vmax:   0.71 m/s ( - )        TR PGmax:

30 mmHg ( - )

IVSd (2D):   0.9 cm (0.6 cm-1.1               MV E/A:      0.89 ( - )

syst. PAP: 35 mmHg  ( - )



cm)                                                       PV Vmax:

0.83 m/s (0.6 m/s-0.9

LVDd (2D):   4.7 cm    (4.2 cm-5.9

m/s)



cm)                                                       PV PGmax:

3  mmHg ( - )

LVDs (2D):   3.5 cm    (2.1 cm-4 



cm)   

LVPWd (2D):  0.9 cm    (0.6 cm-1 



cm)   

LVEF (MM):   54        (>=55 %)   

Visual EF:   45 % 

RVDd(2D):    3.8 cm    (1.9 cm-3.8 



cmmm)  



Patient: STARR RAO                         MRN: [id number]

Study Date: 2018   Page 1 of 2

09:21 AM 









Continued Measurements: 

Chambers                      Valvular Assessment AV/MV

Valvular Assessment TV/PV



Name                       Value          Name

Value    Name                      Value

LADs Lon.0 cm               MV E' Septal:

0.17  m/s   CVP (est.):             5 mmHg

LA Area:                 18.1 cm2         MV E/E' Septal:

3.70

LA Volume:               55 ml            MV E/E' Lateral:

4.60

LA Volume Index:         27.1 ml/m2   

TAPSE:                   2.3 cm    



Findings:             Left Ventricle: 

Normal size left ventricle. No LV hypertrophy. Mildly reduced systolic

LV function. The ejection

fraction is visually estimated to be 45 %. No regional wall motion

abnormality.

Right Ventricle: 

Mildly dilated right ventricle. Mildly reduced RV function.  

Left Atrium: 

The left atrium is normal in size.  

Right Atrium: 

The right atrium is borderline dilated.  

Mitral Valve: 

The mitral valve is normal in appearance. No mitral stenosis is

present. Mild mitral valve

regurgitation is present.  

Aortic Valve: 

The aortic valve is normal in appearance. There is no significant

aortic valve regurgitation.

Tricuspid Valve: 

The tricuspid valve appears normal. Trivial tricuspid valve

regurgitation. The pulmonary artery

pressure is normal.  

Pulmonic Valve: 

The pulmonic valve is normal in appearance and function.  

Aorta: 

The aorta is normal.  

Pericardium: 

No pericardial effusion.  

Exam Comments: 

Tachycardia at 118 bpm. There is no previous exam. . 







Electronically signed by Shannon Valerio MD on 2018 at 10:47 AM 

(No Signature Object) 



Patient: STARR RAO                         MRN: [id number]

Study Date: 2018   Page 2 of 2

09:21 AM 







[id number]

## 2018-12-29 NOTE — HOSPPROG
Hospitalist Progress Note


Assessment/Plan: 





#Septic shock: Influenza, multilobar PNA (h/o MRSA)


-Linezolid, CTX, Tamiflu


-echo to eval for endocarditis. 


-pending: blood cultures, legionella, HIV, HCV


-on pressors, Vit C, hydrocortisone/florinef





#AHRF: due to above. Not requiring intubation now





#Lactic acidosis: hydrate





#Substance abuse: snort/smokes meth. Counseled on cessation





#Tobacco dependence: counseled on cessation





#Tachycardia: due to acute illness





#Sciatica: gabapentin





#Social: recently jailed





#DVT ppx: Lovenox





Inpatient admission: critically-ill requiring pressors, IV abx








Critical care time spent: >35 min evaluating pt, labs and d/w Dr. Koch


Subjective: "feel terrible" Productive brown/red sputum


Objective: 


 Vital Signs











Temp Pulse Resp BP Pulse Ox


 


 36.8 C   95   30 H  107/71   96 


 


 12/29/18 15:49  12/29/18 19:00  12/29/18 19:00  12/29/18 19:00  12/29/18 19:00








 











 12/28/18 12/29/18 12/30/18





 05:59 05:59 05:59


 


Intake Total   3971


 


Output Total   1140


 


Balance   2831








 











PT  17.1 SEC (12.0-15.0)  H  12/29/18  05:15    


 


INR  1.38  (0.83-1.16)  H  12/29/18  05:15    














- Physical Exam


Constitutional: uncomfortable


Ears, Nose, Mouth, Throat: dry mucous membranes


Cardiovascular: tachycardia


Respiratory: rhonchi


Gastrointestinal: normoactive bowel sounds


Genitourinary: no bladder fullness


Skin: warm


Musculoskeletal: other (left subclavian catheter in place)


Neurologic: AAOx3, CN II-XII Intact


Psychiatric: not encephalopathic





ICD10 Worksheet


Patient Problems: 


 Problems











Problem Status Onset


 


Influenza Acute  


 


Pneumonia Acute

## 2018-12-29 NOTE — GOP
DATE OF OPERATION:  12/29/2018



SURGEON:  Charlie Porras MD



ANESTHESIA:  Local.



PREOPERATIVE DIAGNOSIS:  Sepsis.



POSTOPERATIVE DIAGNOSIS:  Sepsis.



PROCEDURE PERFORMED:  Left subclavian vein triple-lumen catheter placement.



DESCRIPTION OF PROCEDURE:  Local anesthesia was infiltrated.  The left 
subclavian was directly punctured and a guidewire passed into the atrium.  No 
ectopy was noted upon the cardiac monitor.  The vein was dilated, and the 
catheter passed to 20 cm without resistance.  There was good venous blood 
return out of all 3 lumens.  These were flushed with saline solution.  The 
catheter was secured to the chest with a silk suture.  Sterile dressing was 
applied.  A portable chest x-ray is pending upon completion.





Job #:  [id number]

MTDD

## 2018-12-29 NOTE — PDMN
Medical Necessity


Medical necessity: MCG M160 Sepsis: 37 yo presents w/ +Influenza A, stable 

sepsis w/ pneumonia, initially OBS but developed severe septic shock overnight w

/ tachycardia 130s, hypotension SBP 70s, cont RR in 30s, severe crushing CP, 

despite aggressive sepsis protocol/IV fluids, emergent transfer to ICU with 

central line placement, now on 2+vasopressors and steroids.  Imaging concerning 

for influenza pneumonia w/ staph.  Pt is critically ill due to life threatening 

organ dysfunction and is at high risk for decompensation and death.  Change to 

IP status 12/29/18@1041 per MD order.

## 2018-12-30 LAB — PLATELET # BLD: 230 10^3/UL (ref 150–400)

## 2018-12-30 RX ADMIN — HYDROCORTISONE SODIUM SUCCINATE SCH MG: 100 INJECTION, POWDER, FOR SOLUTION INTRAMUSCULAR; INTRAVENOUS at 05:57

## 2018-12-30 RX ADMIN — ACETAMINOPHEN PRN MG: 325 TABLET ORAL at 13:19

## 2018-12-30 RX ADMIN — LINEZOLID SCH MG: 600 TABLET, FILM COATED ORAL at 09:45

## 2018-12-30 RX ADMIN — HYDROCORTISONE SODIUM SUCCINATE SCH MG: 100 INJECTION, POWDER, FOR SOLUTION INTRAMUSCULAR; INTRAVENOUS at 17:42

## 2018-12-30 RX ADMIN — GABAPENTIN SCH MG: 300 CAPSULE ORAL at 09:46

## 2018-12-30 RX ADMIN — MIDODRINE HYDROCHLORIDE SCH MG: 5 TABLET ORAL at 21:12

## 2018-12-30 RX ADMIN — ACETAMINOPHEN PRN MG: 325 TABLET ORAL at 21:40

## 2018-12-30 RX ADMIN — OSELTAMIVIR PHOSPHATE SCH MG: 75 CAPSULE ORAL at 09:46

## 2018-12-30 RX ADMIN — MIDODRINE HYDROCHLORIDE SCH MG: 5 TABLET ORAL at 14:39

## 2018-12-30 RX ADMIN — VASOPRESSIN SCH MLS: 20 INJECTION, SOLUTION INTRAMUSCULAR; SUBCUTANEOUS at 00:17

## 2018-12-30 RX ADMIN — THIAMINE HYDROCHLORIDE SCH MLS: 100 INJECTION, SOLUTION INTRAMUSCULAR; INTRAVENOUS at 09:45

## 2018-12-30 RX ADMIN — GABAPENTIN SCH MG: 300 CAPSULE ORAL at 21:12

## 2018-12-30 RX ADMIN — KETOROLAC TROMETHAMINE PRN MG: 30 INJECTION, SOLUTION INTRAMUSCULAR at 23:49

## 2018-12-30 RX ADMIN — OSELTAMIVIR PHOSPHATE SCH MG: 75 CAPSULE ORAL at 21:12

## 2018-12-30 RX ADMIN — KETOROLAC TROMETHAMINE PRN MG: 30 INJECTION, SOLUTION INTRAMUSCULAR at 09:47

## 2018-12-30 RX ADMIN — LINEZOLID SCH MG: 600 TABLET, FILM COATED ORAL at 21:12

## 2018-12-30 RX ADMIN — ASCORBIC ACID SCH MLS: 500 INJECTION, SOLUTION INTRAMUSCULAR; INTRAVENOUS; SUBCUTANEOUS at 09:36

## 2018-12-30 RX ADMIN — GABAPENTIN SCH MG: 300 CAPSULE ORAL at 15:59

## 2018-12-30 RX ADMIN — KETOROLAC TROMETHAMINE PRN MG: 30 INJECTION, SOLUTION INTRAMUSCULAR at 00:26

## 2018-12-30 RX ADMIN — FLUDROCORTISONE ACETATE SCH MG: 0.1 TABLET ORAL at 09:46

## 2018-12-30 RX ADMIN — ENOXAPARIN SODIUM SCH MG: 100 INJECTION SUBCUTANEOUS at 09:48

## 2018-12-30 RX ADMIN — KETOROLAC TROMETHAMINE PRN MG: 30 INJECTION, SOLUTION INTRAMUSCULAR at 16:46

## 2018-12-30 RX ADMIN — ASCORBIC ACID SCH MLS: 500 INJECTION, SOLUTION INTRAMUSCULAR; INTRAVENOUS; SUBCUTANEOUS at 21:13

## 2018-12-30 RX ADMIN — HYDROCORTISONE SODIUM SUCCINATE SCH MG: 100 INJECTION, POWDER, FOR SOLUTION INTRAMUSCULAR; INTRAVENOUS at 12:31

## 2018-12-30 RX ADMIN — ASCORBIC ACID SCH MLS: 500 INJECTION, SOLUTION INTRAMUSCULAR; INTRAVENOUS; SUBCUTANEOUS at 14:40

## 2018-12-30 RX ADMIN — NOREPINEPHRINE BITARTRATE SCH MLS: 1 INJECTION, SOLUTION, CONCENTRATE INTRAVENOUS at 03:22

## 2018-12-30 RX ADMIN — ASCORBIC ACID SCH MLS: 500 INJECTION, SOLUTION INTRAMUSCULAR; INTRAVENOUS; SUBCUTANEOUS at 02:44

## 2018-12-30 RX ADMIN — THIAMINE HYDROCHLORIDE SCH MLS: 100 INJECTION, SOLUTION INTRAMUSCULAR; INTRAVENOUS at 22:03

## 2018-12-30 RX ADMIN — HYDROCORTISONE SODIUM SUCCINATE SCH MG: 100 INJECTION, POWDER, FOR SOLUTION INTRAMUSCULAR; INTRAVENOUS at 00:21

## 2018-12-30 RX ADMIN — CLOTRIMAZOLE SCH APP: 10 CREAM TOPICAL at 09:39

## 2018-12-30 NOTE — PCMIDPN
Assessment/Plan: 


1. Sepsis with multi lobar pneumonia in patient with influenza a and history of 

MRSA:


Greatly improved today.  Still on 2 pressors, but they are being weaned off.  

Oxygenation is stable.  Patient will try and give us a better sputum specimen 

today.  For now, given that he just turned the corner, continue antibiotics as 

is in the form of linezolid, ceftriaxone, and Levaquin.  (will change Levaquin 

and linezolid to p.o.)  Await urine Legionella antigen; if negative, will 

discontinue levofloxacin.  Will also resubmit a deeper sputum specimen to 

ensure he does not have MRSA pneumonia so linezolid can be discontinued as well 

in short order.  Continue Tamiflu for another 3 days.  


2. History of substance abuse:


STI and HIV testing pending.






































12/30/18 08:54





Subjective: 


Turning the corner.  Still on 2 pressors, but they are being weaned off.  Still 

coughing up herson sputum.  No diarrhea.





Objective: 


Linezolid 600 mg IV q.12 hours day 2


Levofloxacin 750 mg IV daily day 2


Ceftriaxone 2 g IV daily day 2


Tamiflu 75 mg p. O. Twice daily day 2


97.7 equals T-max, 100% on 4 L  Vital Signs











Temp Pulse Resp BP Pulse Ox


 


 36.9 C   64   29 H  119/75   100 


 


 12/30/18 02:00  12/30/18 07:00  12/30/18 07:00  12/30/18 07:00  12/30/18 07:00








 Laboratory Results





 12/30/18 04:45 





 12/30/18 04:45 





 











 12/29/18 12/30/18 12/31/18





 05:59 05:59 05:59


 


Intake Total  3971 1300


 


Output Total  1140 1200


 


Balance  2831 100








Sputum specimen was overly contaminated with oral aashish


Blood cultures from 12/28 remain no growth


Influenza a positive


Legionella urine antigen, HIV antibody, STI testing pending





- Physical Exam


General Appearance: alert, no apparent distress


EENT: pharynx normal, No thrush


Respiratory: coarse breath sounds


Cardiac/Chest: regular rate, rhythm


Abdomen: non-tender, soft


Skin: No rash, No embolic lesions


Neuro/Psych: oriented x 3





ICD10 Worksheet


Patient Problems: 


 Problems











Problem Status Onset


 


Influenza Acute  


 


Pneumonia Acute

## 2018-12-30 NOTE — ASMTCMCOM
CM Note

 

CM Note                       

Notes:

39yo male admitted for SOB, PNA, Influenza, Hypoxic respiratory insuff. He has a Hx of 

Sciatica. Patient is a former smoker and drug user. He lives with his parents and 2 children. May 

not have discharge needs.

 

Date Signed:  12/30/2018 12:18 PM

Electronically Signed By:Malinda Qureshi LCSW

## 2018-12-30 NOTE — HOSPPROG
Hospitalist Progress Note


Assessment/Plan: 





#Septic shock: Influenza, multilobar PNA (h/o MRSA)


-Linezolid, LQ, Tamiflu. Stop LQ if Legionella negative


-no vegetation on echo


-pending: blood cultures, legionella, HIV, HCV


-on pressors, Vit C, hydrocortisone/florinef





#AHRF: due to above. Not requiring intubation now





#Lactic acidosis: hydrate





#Substance abuse: snort/smokes meth. Counseled on cessation





#Tobacco dependence: counseled on cessation





#Tachycardia: due to acute illness. Resolved





#Sciatica: gabapentin





#Social: recently jailed





#DVT ppx: Lovenox





Inpatient admission: critically-ill requiring pressors, IV abx








Critical care time spent: >35 min evaluating pt, labs and d/w Dr. Koch


Subjective: easier to breath today


Objective: 


 Vital Signs











Temp Pulse Resp BP Pulse Ox


 


 36.6 C   104 H  27 H  88/50 L  98 


 


 12/30/18 12:00  12/30/18 13:22  12/30/18 13:22  12/30/18 13:22  12/30/18 13:22








 Laboratory Results





 12/30/18 04:45 





 12/30/18 04:45 





 











 12/29/18 12/30/18 12/31/18





 05:59 05:59 05:59


 


Intake Total  3971 1300


 


Output Total  1140 2000


 


Balance  2831 -700








 











PT  17.1 SEC (12.0-15.0)  H  12/29/18  05:15    


 


INR  1.38  (0.83-1.16)  H  12/29/18  05:15    














- Time Spent With Patient


Time Spent with Patient: greater than 35 minutes


Time Spent with Patient: Greater than 35 minutes spent on this patients care, 

greater than 50% of time spent counseling, educating, and coordinating care 

regarding the above mentioned plan.





- Physical Exam


Constitutional: no apparent distress


Eyes: PERRL


Ears, Nose, Mouth, Throat: moist mucous membranes


Cardiovascular: regular rate and rhythym


Respiratory: rhonchi


Gastrointestinal: normoactive bowel sounds


Genitourinary: no bladder fullness


Skin: warm


Musculoskeletal: other (left subclavian catheter)


Neurologic: CN II-XII Intact


Psychiatric: interacting appropriately





ICD10 Worksheet


Patient Problems: 


 Problems











Problem Status Onset


 


Influenza Acute  


 


Pneumonia Acute

## 2018-12-30 NOTE — PDINTPN
Intensivist Progress Note


Assessment/Plan: 





ASSESSMENT


38-year-old male with history of recent incarceration admitted with influenza 

pneumonia and severe septic shock





# septic shock. severe, required "max" doses of NE and vaso plus steroids. 

Improving but still on vasopressin


# influenza pneumonia. Imaging concerning for staph aureus. Pt has h/o MRSA 

infection. 


# acute hypoxemic resp failure. despite severe illness and critical condition, 

patient is currently compensated on supplemental oxygen and does not need 

emergent intubation


# sinus tachycardia, compensatory. improving


# septic cardiomyopathy.  Based on low normal S CV O2 sat as well as TTE with 

relatively depressed LV function.  This should improve as critical illness 

resolves


# lactic acidosis. resolved


# illicit drug use


# recent incarceration





PLAN


# linezolid, CTX and Levaquin per ID. Anticipate narrowing in coming days


# Initiate midodrine 20 mg p.o. Q 8 hr and titrate as necessary to help shorten 

the duration of IV vasopressor use in the recovery phase of septic shock. CHEST 

, Volume 149 , Issue 6 , 1380 - 1383. CHEST , Volume 149 , Issue 6 , 1582 - 

1583 


# wean vasopressin as able


# anticipate diuresing after shock resolves given relative cardiomyopathy and 

positive fluid balance


# hydrocortisone 50 mg Q6h and fludrocortisone 0.05 mg Qday N Engl J Med 2018; 

[id number]


# vitamin-C and thiamine given severity of illness. Can stop after 4-5 days.


# supplemental oxygen as needed.  Avoid hyperoxia


# follow-up culture data


# Feeding - regular diet


# Analgesia APAP, oxy


# Sedation none


# Thromboprophylaxis - SQ hep


# Head of bed elevated


# Ulcer prophylaxis - NA


# Glucose SSI


# Skin no skin breakdown


# Delirium - delirium precautions





Patient is critical ill due to life threatening organ dysfunction and is at 

high risk for decompensation and death. 





**Total critical care time, excluding procedures: 65 min which was spent 

evaluating interviewing patient at bedside, reviewing patient's chart as well 

as imaging, discussing with subspecialists and re-evaluating patient and 

adjusting vaso active medications





EVENTS


12/28/2018 admission, left subclavian central line





CX Data


Viral PCR positive for pneumonia


Sputum cultures mixed respiratory aashish





IMAGING


I personally reviewed interpreted patient's radiographic images well as formal 

radiology reads.


12/29/2018 CT chest dense patchy multifocal infiltrates


12/29/2018 TTE mild to moderately reduced LVEF


12/30/2018 chest x-ray interval worsening of bilateral opacities





12/30/18 13:36





Objective: 





 Vital Signs











Temp Pulse Resp BP Pulse Ox


 


 36.9 C   64   29 H  119/75   100 


 


 12/30/18 02:00  12/30/18 07:00  12/30/18 07:00  12/30/18 07:00  12/30/18 07:00








 Laboratory Results





 12/30/18 04:45 





 12/30/18 04:45 





 











 12/29/18 12/30/18 12/31/18





 05:59 05:59 05:59


 


Intake Total  3971 1300


 


Output Total  1140 1200


 


Balance  2831 100








 











PT  17.1 SEC (12.0-15.0)  H  12/29/18  05:15    


 


INR  1.38  (0.83-1.16)  H  12/29/18  05:15    














Physical Exam





- Physical Exam


General Appearance: no apparent distress, mild distress


EENT: normal ENT inspection, pharynx normal


Neck: non-tender, full range of motion, supple


Respiratory: other (Mild kidney, increased from prior.  Coarse breath sounds 

bilaterally)


Cardiac/Chest: No edema, No gallop


Skin: normal color, warm/dry, No cyanosis


Neuro/Psych: no motor/sensory deficits, alert, normal mood/affect, oriented x 3





ICD10 Worksheet


Patient Problems: 


 Problems











Problem Status Onset


 


Influenza Acute  


 


Pneumonia Acute

## 2018-12-31 LAB
GC AMPLIFICATION GENPROBE: NEGATIVE
HEPATITIS C ANTIBODY TOTAL: NEGATIVE
HIV TYPE 1 AND 2: NEGATIVE
PLATELET # BLD: 273 10^3/UL (ref 150–400)

## 2018-12-31 RX ADMIN — LINEZOLID SCH MG: 600 TABLET, FILM COATED ORAL at 08:19

## 2018-12-31 RX ADMIN — FLUDROCORTISONE ACETATE SCH MG: 0.1 TABLET ORAL at 08:19

## 2018-12-31 RX ADMIN — GABAPENTIN SCH MG: 300 CAPSULE ORAL at 20:49

## 2018-12-31 RX ADMIN — THIAMINE HYDROCHLORIDE SCH MLS: 100 INJECTION, SOLUTION INTRAMUSCULAR; INTRAVENOUS at 09:02

## 2018-12-31 RX ADMIN — HYDROCORTISONE SODIUM SUCCINATE SCH MG: 100 INJECTION, POWDER, FOR SOLUTION INTRAMUSCULAR; INTRAVENOUS at 00:05

## 2018-12-31 RX ADMIN — KETOROLAC TROMETHAMINE PRN MG: 30 INJECTION, SOLUTION INTRAMUSCULAR at 20:49

## 2018-12-31 RX ADMIN — GUAIFENESIN SCH MG: 600 TABLET, EXTENDED RELEASE ORAL at 11:45

## 2018-12-31 RX ADMIN — OSELTAMIVIR PHOSPHATE SCH MG: 75 CAPSULE ORAL at 08:19

## 2018-12-31 RX ADMIN — LINEZOLID SCH MG: 600 TABLET, FILM COATED ORAL at 19:32

## 2018-12-31 RX ADMIN — BENZONATATE PRN MG: 100 CAPSULE, LIQUID FILLED ORAL at 19:32

## 2018-12-31 RX ADMIN — ENOXAPARIN SODIUM SCH MG: 100 INJECTION SUBCUTANEOUS at 08:21

## 2018-12-31 RX ADMIN — GABAPENTIN SCH MG: 300 CAPSULE ORAL at 15:45

## 2018-12-31 RX ADMIN — BENZONATATE PRN MG: 100 CAPSULE, LIQUID FILLED ORAL at 10:40

## 2018-12-31 RX ADMIN — OSELTAMIVIR PHOSPHATE SCH MG: 75 CAPSULE ORAL at 19:32

## 2018-12-31 RX ADMIN — ASCORBIC ACID SCH MLS: 500 INJECTION, SOLUTION INTRAMUSCULAR; INTRAVENOUS; SUBCUTANEOUS at 08:25

## 2018-12-31 RX ADMIN — KETOROLAC TROMETHAMINE PRN MG: 30 INJECTION, SOLUTION INTRAMUSCULAR at 07:47

## 2018-12-31 RX ADMIN — GUAIFENESIN SCH MG: 600 TABLET, EXTENDED RELEASE ORAL at 19:32

## 2018-12-31 RX ADMIN — CLOTRIMAZOLE SCH APP: 10 CREAM TOPICAL at 00:06

## 2018-12-31 RX ADMIN — GABAPENTIN SCH MG: 300 CAPSULE ORAL at 08:19

## 2018-12-31 RX ADMIN — CLOTRIMAZOLE SCH APP: 10 CREAM TOPICAL at 09:10

## 2018-12-31 RX ADMIN — MIDODRINE HYDROCHLORIDE SCH MG: 5 TABLET ORAL at 05:33

## 2018-12-31 RX ADMIN — GUAIFENESIN SCH: 600 TABLET, EXTENDED RELEASE ORAL at 11:19

## 2018-12-31 RX ADMIN — ASCORBIC ACID SCH MLS: 500 INJECTION, SOLUTION INTRAMUSCULAR; INTRAVENOUS; SUBCUTANEOUS at 03:15

## 2018-12-31 RX ADMIN — CLOTRIMAZOLE SCH APP: 10 CREAM TOPICAL at 19:34

## 2018-12-31 RX ADMIN — HYDROCORTISONE SODIUM SUCCINATE SCH MG: 100 INJECTION, POWDER, FOR SOLUTION INTRAMUSCULAR; INTRAVENOUS at 05:32

## 2018-12-31 RX ADMIN — ACETAMINOPHEN PRN MG: 325 TABLET ORAL at 19:33

## 2018-12-31 RX ADMIN — KETOROLAC TROMETHAMINE PRN MG: 30 INJECTION, SOLUTION INTRAMUSCULAR at 14:35

## 2018-12-31 NOTE — PCMIDPN
Assessment/Plan: 


Assessment:


Influenza A followed by secondary bacterial pneumonia with Staphylococcus 

aureus.  Patient is currently on linezolid, ceftriaxone and Levaquin.  He is 

making clinical improvements.  He is not having as much sputum production as he 

did over the last couple of days.  Still remains somewhat hypoxic with effort 

but improved.








Plan:


1. Continue IV linezolid come with ceftriaxone and Levaquin.


2. Follow his improving clinical course.








  


12/31/18 16:14





Subjective: 





Pt states that he is feeling progressively better. Reports increasing dry 

cough with no sputum production and mild dyspnea on exertion. His SpO2 is 96% 

on RA. 





I, Liz Brownlee, am scribing for, and in the presence of, Ten Altamirano MD.


ITen MD, personally performed the services described in this 

documentation, as scribed by Liz Brownlee in my presence, and it is both 

accurate and complete. 


Objective: 


 Vital Signs











Temp Pulse Resp BP Pulse Ox


 


 36.6 C   57 L  18   118/86 H  96 


 


 12/31/18 08:00  12/31/18 10:00  12/31/18 10:00  12/31/18 10:00  12/31/18 10:00








 Microbiology











 12/30/18 08:59  - Final





 Sputum, Expectorated 








 Laboratory Results





 12/31/18 05:30 





 12/31/18 05:30 





 











 12/30/18 12/31/18 01/01/19





 05:59 05:59 05:59


 


Intake Total 3971 5012 680


 


Output Total 1140 6125 


 


Balance 2831 -1113 680








Microbiology:


12/28/18 Blood cx 2/2 sets, NGTD. 


12/29/18 Expectorated sputum, negative. 


12/30/18 Sputum culture grew 2+ Staph aureus and mixed oropharyngeal aashish and 

gram stain is positive for 2+ epithelial cells, 3+ PMNs, 4+ GPC in pairs, 1+ 

yeast, and 1+ gram positive rods.





Imaging studies: 


Chest XRay 12/28 through 12/31 reviewed with most recent impression noted for 

multifocal pneumonia, minimally improved in the peripheral right upper lobe and 

at the left base, and slightly more consolidate in the left upper lobe. 





12/29/18 CT Chest with contrast impression: 1) No central pulmonary embolism. 2

) Multifocal airspace consolidation with foci of early cavitation in bilateral 

upper lobes. No abscess. 3) No effusion or pericardial effusion. 4) Mild 

reactive lymphedema. 





12/29/18 Echocardiogram reviewed.  





- Physical Exam


General Appearance: alert, no apparent distress


Cardiac/Chest: other (left upper chest port with surrounding contact dermatitis)


Skin: No rash


Neuro/Psych: oriented x 3





- Line/s


  ** RUE PICC


Lines: No drainage, No erythema





ICD10 Worksheet


Patient Problems: 


 Problems











Problem Status Onset


 


Influenza Acute  


 


Pneumonia Acute

## 2018-12-31 NOTE — CPEKG
Test Reason : OPEN

Blood Pressure : ***/*** mmHG

Vent. Rate : 137 BPM     Atrial Rate : 138 BPM

   P-R Int : 134 ms          QRS Dur : 075 ms

    QT Int : 278 ms       P-R-T Axes : 044 034 021 degrees

   QTc Int : 420 ms

 

Sinus tachycardia

 

Confirmed by Melo Luke ([id number]) on 12/31/2018 8:21:00 AM

 

Referred By:             Confirmed By:Melo Luke

## 2018-12-31 NOTE — HOSPPROG
Hospitalist Progress Note


Assessment/Plan: 





#Septic shock: Influenza, multilobar PNA (h/o MRSA)


-Linezolid, LQ, Tamiflu. Stop LQ if Legionella negative


-no vegetation on echo


-pending: blood cultures, legionella. HIV negative.


-off pressors


-CBC up, suspect from steroids





#Stap multilobar PNA: on broad-abx. Will discuss narrowing with ID





#AHRF: due to above. Not requiring intubation now





#Lactic acidosis: hydrate





#Substance abuse: snort/smokes meth. Counseled on cessation





#Tobacco dependence: counseled on cessation





#Tachycardia: due to acute illness. Resolved





#Sciatica: gabapentin





#Social: recently jailed





#DVT ppx: Lovenox





Inpatient admission: ok for med-surg








Subjective: feeling better; less sputum production


Objective: 


 Vital Signs











Temp Pulse Resp BP Pulse Ox


 


 36.5 C   60   28 H  125/74 H  93 


 


 12/31/18 05:00  12/31/18 07:00  12/31/18 07:00  12/31/18 07:00  12/31/18 07:00








 Microbiology











 12/30/18 08:59  - Final





 Sputum, Expectorated 








 Laboratory Results





 12/31/18 05:30 





 12/31/18 05:30 





 











 12/30/18 12/31/18 01/01/19





 05:59 05:59 05:59


 


Intake Total 3971 5012 


 


Output Total 1140 6125 


 


Balance 2831 -1113 








 











PT  17.1 SEC (12.0-15.0)  H  12/29/18  05:15    


 


INR  1.38  (0.83-1.16)  H  12/29/18  05:15    














- Time Spent With Patient


Time Spent with Patient: greater than 35 minutes


Time Spent with Patient: Greater than 35 minutes spent on this patients care, 

greater than 50% of time spent counseling, educating, and coordinating care 

regarding the above mentioned plan.





- Physical Exam


Constitutional: no apparent distress


Eyes: PERRL


Ears, Nose, Mouth, Throat: moist mucous membranes


Cardiovascular: regular rate and rhythym


Respiratory: rhonchi (but less than yesterday)


Gastrointestinal: normoactive bowel sounds


Genitourinary: no bladder fullness


Skin: warm


Musculoskeletal: full muscle strength, other (subclavian catheter in place)


Neurologic: AAOx3, CN II-XII Intact


Psychiatric: interacting appropriately





ICD10 Worksheet


Patient Problems: 


 Problems











Problem Status Onset


 


Influenza Acute  


 


Pneumonia Acute

## 2019-01-01 LAB — PLATELET # BLD: 279 10^3/UL (ref 150–400)

## 2019-01-01 RX ADMIN — LINEZOLID SCH MG: 600 TABLET, FILM COATED ORAL at 20:46

## 2019-01-01 RX ADMIN — ACETAMINOPHEN PRN MG: 325 TABLET ORAL at 23:22

## 2019-01-01 RX ADMIN — CLOTRIMAZOLE SCH APP: 10 CREAM TOPICAL at 21:12

## 2019-01-01 RX ADMIN — FLUDROCORTISONE ACETATE SCH MG: 0.1 TABLET ORAL at 08:04

## 2019-01-01 RX ADMIN — KETOROLAC TROMETHAMINE PRN MG: 30 INJECTION, SOLUTION INTRAMUSCULAR at 09:16

## 2019-01-01 RX ADMIN — GABAPENTIN SCH MG: 300 CAPSULE ORAL at 20:46

## 2019-01-01 RX ADMIN — CLOTRIMAZOLE SCH APP: 10 CREAM TOPICAL at 09:19

## 2019-01-01 RX ADMIN — GUAIFENESIN SCH MG: 600 TABLET, EXTENDED RELEASE ORAL at 20:46

## 2019-01-01 RX ADMIN — ACETAMINOPHEN PRN MG: 325 TABLET ORAL at 13:56

## 2019-01-01 RX ADMIN — OSELTAMIVIR PHOSPHATE SCH MG: 75 CAPSULE ORAL at 20:46

## 2019-01-01 RX ADMIN — ENOXAPARIN SODIUM SCH MG: 100 INJECTION SUBCUTANEOUS at 08:02

## 2019-01-01 RX ADMIN — FLUTICASONE PROPIONATE SCH SPRAY: 50 SPRAY, METERED NASAL at 08:06

## 2019-01-01 RX ADMIN — KETOROLAC TROMETHAMINE PRN MG: 30 INJECTION, SOLUTION INTRAMUSCULAR at 23:22

## 2019-01-01 RX ADMIN — ACETAMINOPHEN PRN MG: 325 TABLET ORAL at 03:02

## 2019-01-01 RX ADMIN — FLUTICASONE PROPIONATE SCH SPRAY: 50 SPRAY, METERED NASAL at 03:26

## 2019-01-01 RX ADMIN — GUAIFENESIN SCH MG: 600 TABLET, EXTENDED RELEASE ORAL at 08:04

## 2019-01-01 RX ADMIN — ALBUTEROL SULFATE PRN ML: 2.5 SOLUTION RESPIRATORY (INHALATION) at 20:38

## 2019-01-01 RX ADMIN — KETOROLAC TROMETHAMINE PRN MG: 30 INJECTION, SOLUTION INTRAMUSCULAR at 03:01

## 2019-01-01 RX ADMIN — KETOROLAC TROMETHAMINE PRN MG: 30 INJECTION, SOLUTION INTRAMUSCULAR at 16:17

## 2019-01-01 RX ADMIN — ALBUTEROL SULFATE PRN ML: 2.5 SOLUTION RESPIRATORY (INHALATION) at 04:07

## 2019-01-01 RX ADMIN — BENZONATATE PRN MG: 100 CAPSULE, LIQUID FILLED ORAL at 16:16

## 2019-01-01 RX ADMIN — OSELTAMIVIR PHOSPHATE SCH MG: 75 CAPSULE ORAL at 08:04

## 2019-01-01 RX ADMIN — BENZOCAINE AND MENTHOL PRN EA: 15; 3.6 LOZENGE ORAL at 16:24

## 2019-01-01 RX ADMIN — LINEZOLID SCH MG: 600 TABLET, FILM COATED ORAL at 08:18

## 2019-01-01 RX ADMIN — BENZONATATE PRN MG: 100 CAPSULE, LIQUID FILLED ORAL at 01:44

## 2019-01-01 RX ADMIN — GABAPENTIN SCH MG: 300 CAPSULE ORAL at 16:13

## 2019-01-01 RX ADMIN — ALBUTEROL SULFATE PRN ML: 2.5 SOLUTION RESPIRATORY (INHALATION) at 10:28

## 2019-01-01 RX ADMIN — GABAPENTIN SCH MG: 300 CAPSULE ORAL at 08:04

## 2019-01-01 NOTE — HOSPPROG
Hospitalist Progress Note


Assessment/Plan: 





#Septic shock: Influenza, multilobar PNA (h/o MRSA)


-Linezolid, CTX, Tamiflu


-Stop LQ with negative Legionella


-no vegetations


-negative blood culture. 


-off pressors, CBC nearly normalized





#AHRF: due to above. Not requiring intubation now





#Lactic acidosis: hydrate





#Substance abuse: snort/smokes meth. Counseled on cessation





#Tobacco dependence: counseled on cessation





#Tachycardia: due to acute illness. Resolved





#Sciatica: gabapentin





#Social: recently jailed





#DVT ppx: Lovenox





Inpatient admission: ok for med-surg








Subjective: coughing still persistent with brown/red sputum


Objective: 


 Vital Signs











Temp Pulse Resp BP Pulse Ox


 


 37.0 C   93   24 H  130/79 H  94 


 


 01/01/19 07:51  01/01/19 07:51  01/01/19 07:51  01/01/19 07:51  01/01/19 07:51








 Microbiology











 12/30/18 08:59  - Final





 Sputum, Expectorated Sputum Culture - Final





    Staphylococcus Aureus








 Laboratory Results





 01/01/19 05:20 





 01/01/19 05:20 





 











 12/31/18 01/01/19 01/02/19





 05:59 05:59 05:59


 


Intake Total 5012 2769.3 


 


Output Total 6125  


 


Balance -1113 2769.3 








 











PT  17.1 SEC (12.0-15.0)  H  12/29/18  05:15    


 


INR  1.38  (0.83-1.16)  H  12/29/18  05:15    














- Time Spent With Patient


Time Spent with Patient: greater than 35 minutes


Time Spent with Patient: Greater than 35 minutes spent on this patients care, 

greater than 50% of time spent counseling, educating, and coordinating care 

regarding the above mentioned plan.





- Physical Exam


Constitutional: no apparent distress


Eyes: PERRL


Ears, Nose, Mouth, Throat: moist mucous membranes


Cardiovascular: regular rate and rhythym


Respiratory: rhonchi


Gastrointestinal: normoactive bowel sounds


Genitourinary: no bladder fullness


Skin: warm


Musculoskeletal: full muscle strength


Neurologic: CN II-XII Intact


Psychiatric: flat affect





ICD10 Worksheet


Patient Problems: 


 Problems











Problem Status Onset


 


Influenza Acute  


 


Pneumonia Acute

## 2019-01-01 NOTE — PCMIDPN
Assessment/Plan: 


Assessment:


Influenza A followed by secondary bacterial pneumonia with Staphylococcus 

aureus.  Patient is currently on linezolid, ceftriaxone and Levaquin.  He is 

making clinical improvements but is very tired out from his cough.  Will 

discontinue the Levaquin leaving him on linezolid and ceftriaxone.  I think it 

will take some time for him to improve significantly although there are some 

encouraging signs today such as improved chest x-ray and decreased white blood 

cell count.








Plan:


1. Continue IV linezolid with ceftriaxone.


2. Discontinue Levaquin.


3. Follow his improving clinical course and chest x-ray..








  





Subjective: 





Pt states the coughing fits are taking a toll on me with associated insomnia. 

Lying flat exacerbates his cough. 


Pt's CT Chest scan and CXRs are reviewed with him today at bedside.





I, Liz Brownlee, am scribing for, and in the presence of, Ten Altamirano MD.


ITen MD, personally performed the services described in this 

documentation, as scribed by Liz Brownlee in my presence, and it is both 

accurate and complete.


Objective: 


 Vital Signs











Temp Pulse Resp BP Pulse Ox


 


 37.0 C   93   24 H  130/79 H  94 


 


 01/01/19 07:51  01/01/19 07:51  01/01/19 07:51  01/01/19 07:51  01/01/19 07:51








 Microbiology











 12/30/18 08:59  - Final





 Sputum, Expectorated Sputum Culture - Final





    Staphylococcus Aureus








 Laboratory Results





 01/01/19 05:20 





 01/01/19 05:20 





 











 12/31/18 01/01/19 01/02/19





 05:59 05:59 05:59


 


Intake Total 5012 2769.3 


 


Output Total 6125  


 


Balance -1113 2769.3 





12/29/18 Sputum cx, contaminated with oral aashish.


12/28/18 Blood cx (2) NGTD.


Influenza A positive.


12/29/18 Legionella urine antigen, HIV antibody, STI testing negative; syphilis 

pending results. 





- Physical Exam


General Appearance: alert, other (clammy and flushed in appearance )


Skin: No rash


Neuro/Psych: oriented x 3





ICD10 Worksheet


Patient Problems: 


 Problems











Problem Status Onset


 


Influenza Acute  


 


Pneumonia Acute

## 2019-01-02 LAB — PLATELET # BLD: 284 10^3/UL (ref 150–400)

## 2019-01-02 RX ADMIN — GUAIFENESIN AND CODEINE PHOSPHATE PRN ML: 10; 100 LIQUID ORAL at 08:59

## 2019-01-02 RX ADMIN — GUAIFENESIN AND CODEINE PHOSPHATE PRN ML: 10; 100 LIQUID ORAL at 15:07

## 2019-01-02 RX ADMIN — ACETAMINOPHEN PRN MG: 325 TABLET ORAL at 19:10

## 2019-01-02 RX ADMIN — BENZONATATE PRN MG: 100 CAPSULE, LIQUID FILLED ORAL at 21:13

## 2019-01-02 RX ADMIN — GUAIFENESIN AND CODEINE PHOSPHATE PRN ML: 10; 100 LIQUID ORAL at 21:13

## 2019-01-02 RX ADMIN — ACETAMINOPHEN PRN MG: 325 TABLET ORAL at 13:31

## 2019-01-02 RX ADMIN — BENZONATATE PRN MG: 100 CAPSULE, LIQUID FILLED ORAL at 04:46

## 2019-01-02 RX ADMIN — OSELTAMIVIR PHOSPHATE SCH MG: 75 CAPSULE ORAL at 07:58

## 2019-01-02 RX ADMIN — CLOTRIMAZOLE SCH APP: 10 CREAM TOPICAL at 21:10

## 2019-01-02 RX ADMIN — CLOTRIMAZOLE SCH APP: 10 CREAM TOPICAL at 07:58

## 2019-01-02 RX ADMIN — ENOXAPARIN SODIUM SCH MG: 100 INJECTION SUBCUTANEOUS at 07:59

## 2019-01-02 RX ADMIN — GABAPENTIN SCH MG: 300 CAPSULE ORAL at 21:10

## 2019-01-02 RX ADMIN — GUAIFENESIN SCH MG: 600 TABLET, EXTENDED RELEASE ORAL at 21:10

## 2019-01-02 RX ADMIN — FLUTICASONE PROPIONATE SCH SPRAY: 50 SPRAY, METERED NASAL at 07:58

## 2019-01-02 RX ADMIN — GABAPENTIN SCH MG: 300 CAPSULE ORAL at 15:07

## 2019-01-02 RX ADMIN — Medication SCH MLS: at 13:31

## 2019-01-02 RX ADMIN — BENZOCAINE AND MENTHOL PRN EA: 15; 3.6 LOZENGE ORAL at 04:46

## 2019-01-02 RX ADMIN — ACETAMINOPHEN PRN MG: 325 TABLET ORAL at 04:46

## 2019-01-02 RX ADMIN — BENZOCAINE AND MENTHOL PRN EA: 15; 3.6 LOZENGE ORAL at 22:09

## 2019-01-02 RX ADMIN — KETOROLAC TROMETHAMINE PRN MG: 30 INJECTION, SOLUTION INTRAMUSCULAR at 07:57

## 2019-01-02 RX ADMIN — Medication SCH MLS: at 21:10

## 2019-01-02 RX ADMIN — FLUDROCORTISONE ACETATE SCH MG: 0.1 TABLET ORAL at 07:58

## 2019-01-02 RX ADMIN — LINEZOLID SCH MG: 600 TABLET, FILM COATED ORAL at 07:58

## 2019-01-02 RX ADMIN — GABAPENTIN SCH MG: 300 CAPSULE ORAL at 07:59

## 2019-01-02 RX ADMIN — BENZONATATE PRN MG: 100 CAPSULE, LIQUID FILLED ORAL at 13:31

## 2019-01-02 RX ADMIN — KETOROLAC TROMETHAMINE PRN MG: 30 INJECTION, SOLUTION INTRAMUSCULAR at 13:31

## 2019-01-02 RX ADMIN — GUAIFENESIN SCH MG: 600 TABLET, EXTENDED RELEASE ORAL at 07:58

## 2019-01-02 NOTE — HOSPPROG
Hospitalist Progress Note


Assessment/Plan: 








#New fever: multiple loose stools, persistent cough. 


-WBC back up and progressive infiltrate


-CXR, GI panel, repeat blood cultures





#MSSA multilobar PNA: 


-Febrile overnight. Increased infiltrate (personally reviewed CXR). Repeat CT. 

Discussed with Dr. Arrieta


-cont broad abx


-no vegetation on echo





#Septic shock: resolved. Required pressors





#Influenza: complete 5 days Tamiflu today Influenza





#AHRF: on RA. due to above. Not requiring intubation now





#Lactic acidosis: hydrate





#Substance abuse: snort/smokes meth. Counseled on cessation





#Tobacco dependence: counseled on cessation





#Tachycardia: due to acute illness. Resolved





#Sciatica: gabapentin





#Social: recently jailed





#DVT ppx: Lovenox





Inpatient admission: cont inpt for IV abx, repeat CT








Subjective: coughing during exam. Multiple loose stools past couple of days


Objective: 


 Vital Signs











Temp Pulse Resp BP Pulse Ox


 


 36.9 C   91   18   121/81 H  91 L


 


 01/02/19 08:00  01/02/19 08:00  01/02/19 08:00  01/02/19 08:00  01/02/19 08:00








 Microbiology











 12/30/18 08:59  - Final





 Sputum, Expectorated Sputum Culture - Final





    Staphylococcus Aureus








 Laboratory Results





 01/02/19 04:59 





 01/02/19 04:59 





 











 01/01/19 01/02/19 01/03/19





 05:59 05:59 05:59


 


Intake Total 2769.3 3500 


 


Balance 2769.3 3500 








 











PT  17.1 SEC (12.0-15.0)  H  12/29/18  05:15    


 


INR  1.38  (0.83-1.16)  H  12/29/18  05:15    














- Time Spent With Patient


Time Spent with Patient: greater than 35 minutes


Time Spent with Patient: Greater than 35 minutes spent on this patients care, 

greater than 50% of time spent counseling, educating, and coordinating care 

regarding the above mentioned plan.





- Physical Exam


Constitutional: no apparent distress


Ears, Nose, Mouth, Throat: moist mucous membranes


Cardiovascular: regular rate and rhythym


Respiratory: no respiratory distress, No rhonchi


Gastrointestinal: normoactive bowel sounds, soft, non-tender abdomen


Genitourinary: no bladder fullness


Skin: normal color


Musculoskeletal: full muscle strength


Neurologic: AAOx3, CN II-XII Intact


Psychiatric: interacting appropriately





ICD10 Worksheet


Patient Problems: 


 Problems











Problem Status Onset


 


Influenza Acute  


 


Pneumonia Acute

## 2019-01-02 NOTE — ASMTCMCOM
CM Note

 

CM Note                       

Notes:

Spoke w/RN, pt will be independent at dc but still quite ill. He has the flu and pneumonia, lives 

with his parents and children, works as a . Anticipate he will dc home w/support of family 

when medically stable. CM available for changes.



DC Plan: Independent

 

Date Signed:  01/02/2019 10:53 AM

Electronically Signed By:Allyson Shea RN

## 2019-01-02 NOTE — PCMIDPN
Assessment/Plan: 


Assessment/Plan:


* Multifocal pneumonia due to MSSA status post influenza A:  Recurrent fever 

today with ongoing respiratory symptoms.  Concerning for possible progression 

to lung abscess or necrotizing pneumonia.  Will repeat CT scan of chest to 

further evaluate.  Will consolidate linezolid and ceftriaxone to cefazolin 

primarily targeting MSSA as suspect this is likely etiology for patient's 

pneumonia.  Follow up repeat blood cultures were obtained with recurrent fever.


* Diarrhea:  Improved today.  GI pathogen panel PCR is negative for C difficile.





01/02/19 15:59





01/02/19 16:01





01/02/19 16:03





Subjective: 





Patient with fever and rigors today.  Persistent cough and pleuritic chest 

pain.  2 episodes of diarrhea today which is less than that yesterday when he 

had 4 episodes of watery diarrhea.  No nausea or vomiting.


Objective: 


 Vital Signs











Temp Pulse Resp BP Pulse Ox


 


 36.9 C   91   18   121/81 H  91 L


 


 01/02/19 08:00  01/02/19 08:00  01/02/19 08:00  01/02/19 08:00  01/02/19 08:00








 Microbiology











 01/02/19 08:59 Gastrointestinal Tract Panel (PCR) - Final





 Stool    No Organism Detected By Pcr


 


 12/30/18 08:59  - Final





 Sputum, Expectorated Sputum Culture - Final





    Staphylococcus Aureus








 Laboratory Results





 01/02/19 04:59 





 01/02/19 04:59 





 











 01/01/19 01/02/19 01/03/19





 05:59 05:59 05:59


 


Intake Total 2769.3 3500 


 


Balance 2769.3 3500 








Linezolid #5


Ceftriaxone #5


Sputum with MSSA


Blood cultures x2 1/2/2019 pending


Stool GI pathogen panel by PCR negative





- Physical Exam


General Appearance: alert, non-toxic


EENT: No scleral icterus, No thrush


Respiratory: crackles (Bilateral upper lung fields), other (Frequent episodes 

of cough), No respiratory distress


Cardiac/Chest: regular rate, rhythm, systolic murmur (2/6 left upper sternal 

border)


Extremities: No inflammation


Abdomen: non-tender, No distended


Skin: No embolic lesions





ICD10 Worksheet


Patient Problems: 


 Problems











Problem Status Onset


 


Influenza Acute  


 


Pneumonia Acute

## 2019-01-03 LAB — PLATELET # BLD: 296 10^3/UL (ref 150–400)

## 2019-01-03 RX ADMIN — FLUTICASONE PROPIONATE SCH: 50 SPRAY, METERED NASAL at 09:14

## 2019-01-03 RX ADMIN — GABAPENTIN SCH MG: 300 CAPSULE ORAL at 21:46

## 2019-01-03 RX ADMIN — BENZOCAINE AND MENTHOL PRN EA: 15; 3.6 LOZENGE ORAL at 12:17

## 2019-01-03 RX ADMIN — GUAIFENESIN AND CODEINE PHOSPHATE PRN ML: 10; 100 LIQUID ORAL at 19:41

## 2019-01-03 RX ADMIN — ACETAMINOPHEN PRN MG: 325 TABLET ORAL at 01:39

## 2019-01-03 RX ADMIN — GUAIFENESIN SCH MG: 600 TABLET, EXTENDED RELEASE ORAL at 09:08

## 2019-01-03 RX ADMIN — ACETAMINOPHEN PRN MG: 325 TABLET ORAL at 08:42

## 2019-01-03 RX ADMIN — Medication SCH MLS: at 21:46

## 2019-01-03 RX ADMIN — HYDROMORPHONE HYDROCHLORIDE PRN MG: 1 INJECTION, SOLUTION INTRAMUSCULAR; INTRAVENOUS; SUBCUTANEOUS at 14:19

## 2019-01-03 RX ADMIN — Medication SCH MLS: at 05:21

## 2019-01-03 RX ADMIN — GUAIFENESIN AND CODEINE PHOSPHATE PRN ML: 10; 100 LIQUID ORAL at 12:17

## 2019-01-03 RX ADMIN — GABAPENTIN SCH MG: 300 CAPSULE ORAL at 09:08

## 2019-01-03 RX ADMIN — GUAIFENESIN AND CODEINE PHOSPHATE PRN ML: 10; 100 LIQUID ORAL at 03:35

## 2019-01-03 RX ADMIN — SODIUM CHLORIDE SCH MLS: 900 INJECTION, SOLUTION INTRAVENOUS at 18:25

## 2019-01-03 RX ADMIN — GABAPENTIN SCH MG: 300 CAPSULE ORAL at 18:25

## 2019-01-03 RX ADMIN — ACETAMINOPHEN PRN MG: 325 TABLET ORAL at 18:38

## 2019-01-03 RX ADMIN — ENOXAPARIN SODIUM SCH MG: 100 INJECTION SUBCUTANEOUS at 09:06

## 2019-01-03 RX ADMIN — CLOTRIMAZOLE SCH APP: 10 CREAM TOPICAL at 21:46

## 2019-01-03 RX ADMIN — Medication SCH MLS: at 14:19

## 2019-01-03 RX ADMIN — HYDROMORPHONE HYDROCHLORIDE PRN MG: 1 INJECTION, SOLUTION INTRAMUSCULAR; INTRAVENOUS; SUBCUTANEOUS at 09:07

## 2019-01-03 RX ADMIN — GUAIFENESIN SCH MG: 600 TABLET, EXTENDED RELEASE ORAL at 21:46

## 2019-01-03 RX ADMIN — BENZOCAINE AND MENTHOL PRN EA: 15; 3.6 LOZENGE ORAL at 05:24

## 2019-01-03 RX ADMIN — CLOTRIMAZOLE SCH APP: 10 CREAM TOPICAL at 09:13

## 2019-01-03 RX ADMIN — HYDROMORPHONE HYDROCHLORIDE PRN MG: 1 INJECTION, SOLUTION INTRAMUSCULAR; INTRAVENOUS; SUBCUTANEOUS at 18:51

## 2019-01-03 RX ADMIN — BENZOCAINE AND MENTHOL PRN EA: 15; 3.6 LOZENGE ORAL at 19:41

## 2019-01-03 NOTE — HOSPPROG
Hospitalist Progress Note


Assessment/Plan: 








#Fever: persistent. WBC trending up, increased oxygen needs, progressive 

infiltrate on CT. No area to drain. Negative GI PCR. Had subclavian (pulled 1/1)

, blood cultures NGTD


-negative Legionella, HIV, G&C. Syphilis


-repeat echo to re-evaluate for vegetations. Discussed with Dr. Barnett





#MSSA multilobar PNA: Linzolid. Plan as above





#Septic shock: resolved. Required pressors





#Influenza: Tamiflu course completed





#AHRF: increased oxygen needs





#Lactic acidosis: hydrate





#Substance abuse: snort/smokes meth. Counseled on cessation





#Tobacco dependence: counseled on cessation





#Tachycardia: febrile. APAP, IVFs





#Sciatica: gabapentin





#Social: recently jailed





#DVT ppx: Lovenox





Inpatient admission: cont inpt for IV abx, repeat CT








Subjective: headache, neck pain. Persistent cough


Objective: 


 Vital Signs











Temp Pulse Resp BP Pulse Ox


 


 37.7 C   84   18   134/81 H  93 


 


 01/03/19 15:59  01/03/19 15:59  01/03/19 15:59  01/03/19 15:59  01/03/19 15:59








 Microbiology











 01/02/19 08:59 Gastrointestinal Tract Panel (PCR) - Final





 Stool    No Organism Detected By Pcr








 Laboratory Results





 01/03/19 05:17 





 01/03/19 05:17 





 











 01/02/19 01/03/19 01/04/19





 05:59 05:59 05:59


 


Intake Total 3500 3350 


 


Balance 3500 3350 








 











PT  17.1 SEC (12.0-15.0)  H  12/29/18  05:15    


 


INR  1.38  (0.83-1.16)  H  12/29/18  05:15    














- Time Spent With Patient


Time Spent with Patient: greater than 35 minutes


Time Spent with Patient: Greater than 35 minutes spent on this patients care, 

greater than 50% of time spent counseling, educating, and coordinating care 

regarding the above mentioned plan.





- Physical Exam


Constitutional: other (ill-appearing. Coughing)


Eyes: PERRL


Ears, Nose, Mouth, Throat: moist mucous membranes


Cardiovascular: regular rate and rhythym


Respiratory: rhonchi


Gastrointestinal: normoactive bowel sounds, soft, non-tender abdomen, tenderness


Genitourinary: No agarwal in urethra


Skin: warm


Musculoskeletal: full muscle strength


Neurologic: AAOx3, CN II-XII Intact, other (no meningmus)


Psychiatric: interacting appropriately, flat affect





ICD10 Worksheet


Patient Problems: 


 Problems











Problem Status Onset


 


Influenza Acute  


 


Pneumonia Acute

## 2019-01-03 NOTE — PCMIDPN
Assessment/Plan: 





#MSSA multifocal cavitary PNA complicating influenza s/p tamiflu.  CT scan of 

chest yesterday showed overall worsening but suspect this is expected course/

evolution of CT scan with pneumonia.  No clear drainable focus identified  


--continue cefazolin





#Fever, HA and severe neck pain: no new deficits and no meningismus.  Cdiff 

negative yesterday. Suspect HA due to fever, but due to severity, warrants w/u


--will obtain an MRI of the head and neck to look for diskitis, osteomyelitis, 

epidural abscess


--check LFTs and monitor for rash as evolving drug reaction could be possible


--blood cultures were repeated yesterday and all central lines are out





meds, Abx #6


cefazolin 2gm IV q8h #1





Discussed with Dr. Robbins and nursing.


Subjective: 


Pt initially states that he feels like jumping off the building because he 

does not feel like he is improving. Complains of thoracic back pain, severe 

headache described as an arrow that starts at the base of his occipital lobe 

which radiates to his frontal lobe. Is described as the worse headache of his 

life. His headache began x2 days ago. Also notes that the HA harbingers his 

fever and when his fever resolves his HA subsides. Reports associated 

productive cough, photosensitivity, lack of appetite (only tolerates fruit), 

hoarse voice, and diarrhea. Reports that the highest spirometry reading is 1700 

milliliters. 





Was a  and then got a job a [employer].





ILiz, am scribing for, and in the presence of, Teena Barnett MD.


ITeena MD, personally performed the services described in this 

documentation, as scribed by Liz Brownlee in my presence, and it is both 

accurate and complete.


Objective: 


 Vital Signs











Temp Pulse Resp BP Pulse Ox


 


 36.9 C   93   20   110/71   93 


 


 01/03/19 10:35  01/03/19 10:35  01/03/19 10:35  01/03/19 10:35  01/03/19 10:35








 Microbiology











 01/02/19 08:59 Gastrointestinal Tract Panel (PCR) - Final





 Stool    No Organism Detected By Pcr








 Laboratory Results





 01/03/19 05:17 





 01/03/19 05:17 





 











 01/02/19 01/03/19 01/04/19





 05:59 05:59 05:59


 


Intake Total 3500 3350 


 


Balance 3500 3350 








CT Chest with IV contrast impression: 1) Interval worsening of multifocal 

consolidative opacities and cavitation with development of small bilateral 

pleural effusions. 2) No loculated fluid collection is seen within the 

pulmonary parenchyma to suggest intrapulmonary abscess formation. Personally 

reviewed CT scan





Echocardiogram reviewed. 





- Physical Exam


General Appearance: alert, apparent distress, other (generalized weakness and 

malaise )


EENT: photophobia, other (fair dentition, no oral ulcerations ), No scleral 

icterus, No thrush


Respiratory: coarse breath sounds (scattered ), other (communicative with 

difficulty completing full sentences due to coughing ), No accessory muscle use


Neck: supple, other (no pain with flexion, no meningismus)


Cardiac/Chest: regular rate, rhythm


Extremities: other (R-ankle monitor/bracelet in place ), No pedal edema


Abdomen: non-tender, soft


Skin: other (no nail changes, no peripheral stigmata of endocarditis, extensive 

tattooing), No rash


Neuro/Psych: no motor/sensory deficits, alert, oriented x 3, depressed affect, 

No abnormal CN





- Time Spent With Patient


Time Spent with Patient: greater than 35 minutes


Time Spent with Patient: Greater than 35 minutes spent on this patients care, 

greater than 50% of time spent counseling, educating, and coordinating care 

regarding the above mentioned plan.





ICD10 Worksheet


Patient Problems: 


 Problems











Problem Status Onset


 


Influenza Acute  


 


Pneumonia Acute

## 2019-01-03 NOTE — ECHO
[email or web address]





[address] [address] 

Main: [phone] 



Fax: 



Transthoracic Echocardiogram 

Name:             STARR RAO                          MR#:

[id number]

Study Date:       01/03/2019                            Study Time:

01:35 PM

YOB: 1980                            Age:

38 year(s)

Height:           182.9 cm (72 in.)                     Weight:

81.19 kg (179 lb.)

BSA:              2.03 m2                               Gender:

Male

Examination:      Limited Echo                          Indication:

Persistent fever with MSSA, PNA

Image Quality:                                          Contrast: 

Requested by:     Nguyen Robbins                        BP:

118 mmHg/75 mmHg

Heart Rate:                                             Rhythm: 

Indication:       Persistent fever with MSSA, PNA 



Procedure Staff 

Ultrasound Technician:   Hank Porter RDCS 

Reading Physician:       Pedro Cruz MD 

Requesting Provider: 



Conclusions:          This is a limited echo to evaluate for valve

vegetation. There is no obvious evidence of a aortic or

mitral valve vegetation. There is no evidence of tricuspid valve or

pulmonic valve vegetation.

Compared to the previous exam of 12/29/18 the Left venticular function

has improved from 45% to

75%. 



Measurements: 

Chambers                   Valvular Assessment AV/MV          Valvular

Assessment TV/PV



Normal                                 Normal

Normal

Name         Value    Range             Name        Value Range

Name          Value Range

IVSd (2D):   0.9 cm (0.6 cm-1.1 



cm)   

LVDd (2D):   4.8 cm   (4.2 cm-5.9 



cm)   

LVDs (2D):   2.7 cm   (2.1 cm-4 



cm)   

LVPWd (2D):  1.0 cm   (0.6 cm-1 



cm)   

LVEF (2D):   74       (>=54 %)   



Continued Measurements: 



Findings: 

Exam Comments: 

This is a limited echo to evaluate for valve vegetation. There is no

obvious evidence of a aortic or

mitral valve vegetation. There is no evidence of tricuspid valve or

pulmonic valve vegetation.

Compared to the previous exam of 12/29/18 the Left venticular function

has improved from 45% to

75%. 







Patient: STARR RAO                        MRN: [id number]

Study Date: 01/03/2019   Page 1 of 2

01:35 PM 









Electronically signed by Pedro Cruz MD on 01/03/2019 at 03:02 PM



(No Signature Object) 



Patient: STARR RAO                        MRN: [id number]

Study Date: 01/03/2019   Page 2 of 2

01:35 PM 







[id number]

## 2019-01-04 LAB — PLATELET # BLD: 324 10^3/UL (ref 150–400)

## 2019-01-04 RX ADMIN — Medication SCH MLS: at 21:15

## 2019-01-04 RX ADMIN — BENZOCAINE AND MENTHOL PRN EA: 15; 3.6 LOZENGE ORAL at 15:55

## 2019-01-04 RX ADMIN — OXYCODONE HYDROCHLORIDE PRN MG: 15 TABLET ORAL at 02:40

## 2019-01-04 RX ADMIN — SODIUM CHLORIDE SCH MLS: 900 INJECTION, SOLUTION INTRAVENOUS at 13:31

## 2019-01-04 RX ADMIN — SODIUM CHLORIDE SCH MLS: 900 INJECTION, SOLUTION INTRAVENOUS at 00:50

## 2019-01-04 RX ADMIN — CLOTRIMAZOLE SCH APP: 10 CREAM TOPICAL at 07:55

## 2019-01-04 RX ADMIN — GABAPENTIN SCH MG: 300 CAPSULE ORAL at 21:16

## 2019-01-04 RX ADMIN — OXYCODONE HYDROCHLORIDE PRN MG: 15 TABLET ORAL at 12:35

## 2019-01-04 RX ADMIN — GABAPENTIN SCH MG: 300 CAPSULE ORAL at 15:55

## 2019-01-04 RX ADMIN — GUAIFENESIN AND CODEINE PHOSPHATE PRN ML: 10; 100 LIQUID ORAL at 13:31

## 2019-01-04 RX ADMIN — OXYCODONE HYDROCHLORIDE PRN MG: 15 TABLET ORAL at 07:47

## 2019-01-04 RX ADMIN — GUAIFENESIN AND CODEINE PHOSPHATE PRN ML: 10; 100 LIQUID ORAL at 07:46

## 2019-01-04 RX ADMIN — FLUTICASONE PROPIONATE SCH: 50 SPRAY, METERED NASAL at 10:58

## 2019-01-04 RX ADMIN — OXYCODONE HYDROCHLORIDE PRN MG: 15 TABLET ORAL at 19:38

## 2019-01-04 RX ADMIN — ACETAMINOPHEN PRN MG: 325 TABLET ORAL at 17:01

## 2019-01-04 RX ADMIN — CLOTRIMAZOLE SCH: 10 CREAM TOPICAL at 21:40

## 2019-01-04 RX ADMIN — Medication SCH MLS: at 13:31

## 2019-01-04 RX ADMIN — BENZOCAINE AND MENTHOL PRN EA: 15; 3.6 LOZENGE ORAL at 07:47

## 2019-01-04 RX ADMIN — HYDROMORPHONE HYDROCHLORIDE PRN MG: 1 INJECTION, SOLUTION INTRAMUSCULAR; INTRAVENOUS; SUBCUTANEOUS at 00:50

## 2019-01-04 RX ADMIN — ENOXAPARIN SODIUM SCH MG: 100 INJECTION SUBCUTANEOUS at 07:48

## 2019-01-04 RX ADMIN — Medication SCH MLS: at 05:21

## 2019-01-04 RX ADMIN — ACETAMINOPHEN PRN MG: 325 TABLET ORAL at 23:35

## 2019-01-04 RX ADMIN — HYDROMORPHONE HYDROCHLORIDE PRN MG: 1 INJECTION, SOLUTION INTRAMUSCULAR; INTRAVENOUS; SUBCUTANEOUS at 16:56

## 2019-01-04 RX ADMIN — GABAPENTIN SCH MG: 300 CAPSULE ORAL at 07:48

## 2019-01-04 RX ADMIN — ACETAMINOPHEN PRN MG: 325 TABLET ORAL at 02:38

## 2019-01-04 RX ADMIN — GUAIFENESIN SCH MG: 600 TABLET, EXTENDED RELEASE ORAL at 07:47

## 2019-01-04 RX ADMIN — HYDROMORPHONE HYDROCHLORIDE PRN MG: 1 INJECTION, SOLUTION INTRAMUSCULAR; INTRAVENOUS; SUBCUTANEOUS at 23:36

## 2019-01-04 RX ADMIN — ACETAMINOPHEN PRN MG: 325 TABLET ORAL at 07:48

## 2019-01-04 RX ADMIN — GUAIFENESIN AND CODEINE PHOSPHATE PRN ML: 10; 100 LIQUID ORAL at 01:46

## 2019-01-04 RX ADMIN — GUAIFENESIN AND CODEINE PHOSPHATE PRN ML: 10; 100 LIQUID ORAL at 23:36

## 2019-01-04 RX ADMIN — ACETAMINOPHEN PRN MG: 325 TABLET ORAL at 12:35

## 2019-01-04 RX ADMIN — BENZONATATE PRN MG: 100 CAPSULE, LIQUID FILLED ORAL at 07:47

## 2019-01-04 RX ADMIN — BENZONATATE PRN MG: 100 CAPSULE, LIQUID FILLED ORAL at 15:55

## 2019-01-04 RX ADMIN — GUAIFENESIN SCH MG: 600 TABLET, EXTENDED RELEASE ORAL at 21:15

## 2019-01-04 NOTE — HOSPPROG
Hospitalist Progress Note


Assessment/Plan: 





Carrillo is a 39yo M who was diagnosed with influenza A and returned to the ED due 

to persistent fevers and worsening shortness of breath. First encounter, chart 

reviewed.





#Fever w associated leukocytosis 


-subclavian (pulled 1/1), blood cultures NGTD


-negative Legionella, HIV, G&C, Syphilis


-repeat echo showed improved cardiac function, no valvular abnormalities


-MRI of brain and neck showed no abscess, diskitis or osteomyelitis


-second set of blood cx show no growth





#rash


-none noted today





#MSSA multilobar PNA complicated in association w influenza 


-Cefazolin





#Septic shock: resolved. 


-Required pressors





#Influenza: Tamiflu course completed





#AHRF


-on room air today





#Lactic acidosis


-resolved





#Substance abuse: snort/smokes meth. 


-Counseled on cessation





#Tobacco dependence: 


-counseled on cessation





#Tachycardia: febrile


-resolved, will dc fluids





#Sciatica: gabapentin





#Social: recently jailed





#DVT ppx: Lovenox





#plan: encouraged Carrillo to get OOB, ambulate in hallways w a mask. He's making 

improvement, but needs more time. 


Subjective: Carrillo feels tired, gets lightheaded w activity.


Objective: 


 Vital Signs











Temp Pulse Resp BP Pulse Ox


 


 36.7 C   92   18   126/84 H  96 


 


 01/04/19 08:00  01/04/19 08:00  01/04/19 08:00  01/04/19 08:00  01/04/19 08:00








 Laboratory Results





 01/04/19 05:15 





 01/03/19 05:17 





 











 01/03/19 01/04/19 01/05/19





 05:59 05:59 05:59


 


Intake Total 3350 4116 


 


Balance 3350 4116 








 











PT  17.1 SEC (12.0-15.0)  H  12/29/18  05:15    


 


INR  1.38  (0.83-1.16)  H  12/29/18  05:15    














- Physical Exam


Constitutional: no apparent distress, appears nourished, not in pain


Eyes: PERRL


Ears, Nose, Mouth, Throat: hearing normal


Cardiovascular: regular rate and rhythym


Respiratory: no respiratory distress, rhonchi (few scattered)


Gastrointestinal: normoactive bowel sounds


Skin: warm, other (flushed)


Musculoskeletal: generalized weakness


Neurologic: AAOx3


Psychiatric: interacting appropriately





ICD10 Worksheet


Patient Problems: 


 Problems











Problem Status Onset


 


Influenza Acute  


 


Pneumonia Acute

## 2019-01-04 NOTE — PCMIDPN
Assessment/Plan: 





#MSSA multifocal cavitary PNA complicating influenza s/p tamiflu.  Improved 

lung function w increased level on IS and decreased O2 requirements.


--continue cefazolin


--still awaiting more improvement to determine recommendation for antibiotic 

duration





# fever, leukocytosis.  Fever and wbc seems to be trending down a bit today, 

suspect due to primary problem, severe necrotizing pneumonia due to MSSA and 

may intermittently continue.  Workup for other causes:  Echo showed improved 

cardiac function, no new valvular abnormalities, MRI of brain and neck showed 

no findings consistent with epidural abscess, diskitis or osteomyelitis.  There 

was some bone marrow abnormalities, unclear significance.


--continue to monitor blood cultures, fever curve





# Transient rash, not present this AM





meds, Abx #7


cefazolin 2gm IV q8h #2





Microbiology


12/30/18 08:59   Sputum, MSSA


12/28/18 18:00   Blood   Cx (2) Neg


01/02/19 09:18   Blood   Cx (2) NGTD





Subjective: 





Cough is a little bit better, less headache but patient attributes to starting 

oxycodone


Reports a fever to 102.5 at around 8:00 a.m. yesterday


GI symptoms are stable, stool slightly loose


Able to get incentive spirometer up to 2000 mL today


Objective: 


 Vital Signs











Temp Pulse Resp BP Pulse Ox


 


 36.7 C   92   18   126/84 H  96 (2L)


 


 01/04/19 08:00  01/04/19 08:00  01/04/19 08:00  01/04/19 08:00  01/04/19 08:00








 Laboratory Results





 01/04/19 05:15 





 01/03/19 05:17 





 











 01/03/19 01/04/19 01/05/19





 05:59 05:59 05:59


 


Intake Total 3350 4116 


 


Balance 3350 4116 














- Physical Exam


General Appearance: alert, no apparent distress


Respiratory: normal breath sounds, other (Mildly breathless), No accessory 

muscle use, No crackles, No wheezing


Neck: supple


Cardiac/Chest: regular rate, rhythm, No systolic murmur


Extremities: No pedal edema


Male Genitalia: No agarwal


Skin: warm/dry, pallor, No diaphoresis, No jaundice, No rash, No embolic lesions


Neuro/Psych: alert, normal mood/affect, oriented x 3





- Line/s


  ** PIV


Lines: other (Left forearm), No drainage, No erythema





- Time Spent With Patient


Time Spent with Patient: greater than 35 minutes


Time Spent with Patient: Greater than 35 minutes spent on this patients care, 

greater than 50% of time spent counseling, educating, and coordinating care 

regarding the above mentioned plan.





ICD10 Worksheet


Patient Problems: 


 Problems











Problem Status Onset


 


Influenza Acute  


 


Pneumonia Acute

## 2019-01-04 NOTE — ASMTCMCOM
CM Note

 

CM Note                       

Notes:

Reviewed chart, pt recovering slowly but has been cleared by PT/OT for home. Anticipate pt will dc 

home w/support of parents when medically stable. CM available for any changes.



DC Plan: Independent

 

Date Signed:  01/04/2019 02:13 PM

Electronically Signed By:Allyson Shea RN

## 2019-01-05 RX ADMIN — ACETAMINOPHEN PRN MG: 325 TABLET ORAL at 12:29

## 2019-01-05 RX ADMIN — GUAIFENESIN AND CODEINE PHOSPHATE PRN ML: 10; 100 LIQUID ORAL at 09:13

## 2019-01-05 RX ADMIN — NYSTATIN SCH APP: 100000 POWDER TOPICAL at 13:46

## 2019-01-05 RX ADMIN — FLUCONAZOLE SCH MG: 100 TABLET ORAL at 13:45

## 2019-01-05 RX ADMIN — Medication SCH MLS: at 13:44

## 2019-01-05 RX ADMIN — NYSTATIN SCH APP: 100000 POWDER TOPICAL at 21:23

## 2019-01-05 RX ADMIN — OXYCODONE HYDROCHLORIDE PRN MG: 15 TABLET ORAL at 02:48

## 2019-01-05 RX ADMIN — OXYCODONE HYDROCHLORIDE PRN MG: 15 TABLET ORAL at 09:15

## 2019-01-05 RX ADMIN — OXYCODONE HYDROCHLORIDE PRN MG: 15 TABLET ORAL at 13:44

## 2019-01-05 RX ADMIN — GABAPENTIN SCH MG: 300 CAPSULE ORAL at 21:23

## 2019-01-05 RX ADMIN — FLUTICASONE PROPIONATE SCH: 50 SPRAY, METERED NASAL at 09:22

## 2019-01-05 RX ADMIN — ACETAMINOPHEN PRN MG: 325 TABLET ORAL at 20:30

## 2019-01-05 RX ADMIN — GUAIFENESIN SCH MG: 600 TABLET, EXTENDED RELEASE ORAL at 09:13

## 2019-01-05 RX ADMIN — Medication SCH MLS: at 06:20

## 2019-01-05 RX ADMIN — OXYCODONE HYDROCHLORIDE PRN MG: 15 TABLET ORAL at 20:31

## 2019-01-05 RX ADMIN — ACETAMINOPHEN PRN MG: 325 TABLET ORAL at 04:13

## 2019-01-05 RX ADMIN — GABAPENTIN SCH: 300 CAPSULE ORAL at 13:47

## 2019-01-05 RX ADMIN — ENOXAPARIN SODIUM SCH MG: 100 INJECTION SUBCUTANEOUS at 09:13

## 2019-01-05 RX ADMIN — GUAIFENESIN SCH MG: 600 TABLET, EXTENDED RELEASE ORAL at 20:31

## 2019-01-05 RX ADMIN — GUAIFENESIN AND CODEINE PHOSPHATE PRN ML: 10; 100 LIQUID ORAL at 18:32

## 2019-01-05 RX ADMIN — ALBUTEROL SULFATE PRN ML: 2.5 SOLUTION RESPIRATORY (INHALATION) at 15:02

## 2019-01-05 RX ADMIN — CLOTRIMAZOLE SCH APP: 10 CREAM TOPICAL at 09:23

## 2019-01-05 RX ADMIN — Medication SCH MLS: at 21:23

## 2019-01-05 RX ADMIN — GABAPENTIN SCH MG: 300 CAPSULE ORAL at 09:12

## 2019-01-05 NOTE — PCMIDPN
Assessment/Plan: 


Assessment:


Influenza A followed by secondary necrotic pneumonia with MSSA.  Cefazolin 

monotherapy.  He is making clinical improvements such as decreased oxygen 

requirement but still has a clinical appearance of illness and toxicity.  Follow

-up CT scan from 1/2/2019 shows the extent of necrotizing Staph aureus disease.

  Today the patient is complaining of a rash outbreak at the base of his 

scrotum which on examination is consistent with herpes simplex virus.  We will 

place the patient on Valtrex 500 mg p.o. Twice daily for 3 days.  Patient also 

complains of a sacral cleft that he occasionally gets discharge from 

discharging earlier today.  Examination reveals excoriated tissue and possibly 

a fungal infection but no active drainage currently.  Will use nystatin on the 

excoriated area and start fluconazole 100 mg p. O. Q.day.  








Plan:


1. Continue IV cefazolin.


2. Valtrex 500 mg p.o. Twice daily x3 days.


3. Fluconazole 100 mg p.o. Daily.


4. Follow clinical appearance.  








  





Subjective: 





Patient is resting in his hospital bed.  He appears exhausted and ill.  

Occasional cough.  Complains of rash the base of the scrotum as well as 

irritation at the sacral area.  No fevers.


Objective: 


Cefazolin # 3


Valtrex


 Vital Signs











Temp Pulse Resp BP Pulse Ox


 


 37.6 C   91   18   106/71   91 L


 


 01/05/19 07:17  01/05/19 07:17  01/05/19 07:17  01/05/19 07:17  01/05/19 07:17








 Laboratory Results





 01/04/19 05:15 





 01/03/19 05:17 





 











 01/04/19 01/05/19 01/06/19





 05:59 05:59 05:59


 


Intake Total 4116  


 


Balance 4116  














- Physical Exam


General Appearance: WD/WN, alert, apparent distress (Mild), toxic (Mildly)


Respiratory: lungs clear, normal breath sounds, No respiratory distress, No 

crackles


Cardiac/Chest: regular rate, rhythm, No tachycardia


Skin: normal color, warm/dry, rash (Herpetiform appearing rash at the base of 

the scrotum.  Excoriated area in the sacral distribution.)


Neuro/Psych: alert, normal mood/affect, oriented x 3





ICD10 Worksheet


Patient Problems: 


 Problems











Problem Status Onset


 


Influenza Acute  


 


Pneumonia Acute

## 2019-01-05 NOTE — HOSPPROG
Hospitalist Progress Note


Assessment/Plan: 





Carrillo is a 39yo M who was diagnosed with influenza A and returned to the ED due 

to persistent fevers and worsening shortness of breath. Evaluated the patient w 

Dr Altamirano.





#Fever w associated leukocytosis 


-subclavian (pulled 1/1), blood cultures NGTD


-negative Legionella, HIV, G&C, Syphilis


-repeat echo showed improved cardiac function, no valvular abnormalities


-MRI of brain and neck showed no abscess, diskitis or osteomyelitis


-second set of blood cx show no growth





#HSV (scrotal region)


-Valtrex bid x 3 days





#sacral cleft, patient said it was having some drainage


-possible fungal, Fluconazole ordered





#MSSA multilobar PNA complicated in association w influenza 


-Cefazolin





#Septic shock: resolved. 


-Required pressors





#Influenza: Tamiflu course completed





#headaches


-takes Tylenol for this


-trial of ibuprofen since he is meeting limit on acetaminophen





#AHRF


-on room air today





#Lactic acidosis


-resolved





#Substance abuse: snort/smokes meth. 


-Counseled on cessation





#Tobacco dependence: 


-counseled on cessation





#Tachycardia: febrile


-resolved, will dc fluids





#Sciatica: gabapentin





#Social: recently jailed





#DVT ppx: Lovenox





#plan: Valtrex and fluconazole today. 


Subjective: Carrillo said he upper back on the left side is hurting.  Says overall 

he is feeling poorly.


Objective: 


 Vital Signs











Temp Pulse Resp BP Pulse Ox


 


 37.6 C   91   18   106/71   91 L


 


 01/05/19 07:17  01/05/19 07:17  01/05/19 07:17  01/05/19 07:17  01/05/19 07:17








 Laboratory Results





 01/04/19 05:15 





 01/03/19 05:17 





 











 01/04/19 01/05/19 01/06/19





 05:59 05:59 05:59


 


Intake Total 4116  


 


Balance 4116  








 











PT  17.1 SEC (12.0-15.0)  H  12/29/18  05:15    


 


INR  1.38  (0.83-1.16)  H  12/29/18  05:15    














- Physical Exam


Constitutional: uncomfortable, No not in pain


Eyes: PERRL


Ears, Nose, Mouth, Throat: hearing normal


Cardiovascular: regular rate and rhythym


Respiratory: no respiratory distress


Skin: warm, rash (scrotal area, HSV.  sacral area w some redness, small area of 

excoriation)


Musculoskeletal: full muscle strength


Neurologic: AAOx3


Psychiatric: anxious





ICD10 Worksheet


Patient Problems: 


 Problems











Problem Status Onset


 


Influenza Acute  


 


Pneumonia Acute

## 2019-01-06 LAB — PLATELET # BLD: 376 10^3/UL (ref 150–400)

## 2019-01-06 RX ADMIN — GABAPENTIN SCH MG: 300 CAPSULE ORAL at 16:15

## 2019-01-06 RX ADMIN — GUAIFENESIN AND CODEINE PHOSPHATE PRN ML: 10; 100 LIQUID ORAL at 20:00

## 2019-01-06 RX ADMIN — OXYCODONE HYDROCHLORIDE PRN MG: 15 TABLET ORAL at 00:40

## 2019-01-06 RX ADMIN — GABAPENTIN SCH: 300 CAPSULE ORAL at 07:54

## 2019-01-06 RX ADMIN — FLUCONAZOLE SCH MG: 100 TABLET ORAL at 07:53

## 2019-01-06 RX ADMIN — NYSTATIN SCH APP: 100000 POWDER TOPICAL at 16:16

## 2019-01-06 RX ADMIN — ENOXAPARIN SODIUM SCH MG: 100 INJECTION SUBCUTANEOUS at 07:53

## 2019-01-06 RX ADMIN — ACETAMINOPHEN PRN MG: 325 TABLET ORAL at 07:53

## 2019-01-06 RX ADMIN — DICLOFENAC SCH APP: 10 GEL TOPICAL at 13:52

## 2019-01-06 RX ADMIN — NYSTATIN SCH APP: 100000 POWDER TOPICAL at 21:21

## 2019-01-06 RX ADMIN — FLUTICASONE PROPIONATE SCH: 50 SPRAY, METERED NASAL at 07:54

## 2019-01-06 RX ADMIN — Medication SCH MLS: at 05:04

## 2019-01-06 RX ADMIN — BENZOCAINE AND MENTHOL PRN EA: 15; 3.6 LOZENGE ORAL at 05:42

## 2019-01-06 RX ADMIN — GABAPENTIN SCH MG: 300 CAPSULE ORAL at 21:21

## 2019-01-06 RX ADMIN — BENZONATATE PRN MG: 100 CAPSULE, LIQUID FILLED ORAL at 07:53

## 2019-01-06 RX ADMIN — DICLOFENAC SCH: 10 GEL TOPICAL at 20:02

## 2019-01-06 RX ADMIN — Medication SCH MLS: at 13:53

## 2019-01-06 RX ADMIN — GUAIFENESIN AND CODEINE PHOSPHATE PRN ML: 10; 100 LIQUID ORAL at 03:00

## 2019-01-06 RX ADMIN — GUAIFENESIN SCH MG: 600 TABLET, EXTENDED RELEASE ORAL at 07:54

## 2019-01-06 RX ADMIN — GUAIFENESIN AND CODEINE PHOSPHATE PRN ML: 10; 100 LIQUID ORAL at 10:16

## 2019-01-06 RX ADMIN — ACETAMINOPHEN PRN MG: 325 TABLET ORAL at 16:16

## 2019-01-06 RX ADMIN — OXYCODONE HYDROCHLORIDE PRN MG: 15 TABLET ORAL at 16:24

## 2019-01-06 RX ADMIN — BENZONATATE PRN MG: 100 CAPSULE, LIQUID FILLED ORAL at 00:40

## 2019-01-06 RX ADMIN — Medication SCH MLS: at 21:21

## 2019-01-06 RX ADMIN — DICLOFENAC SCH: 10 GEL TOPICAL at 16:15

## 2019-01-06 RX ADMIN — IBUPROFEN PRN MG: 200 TABLET, FILM COATED ORAL at 20:01

## 2019-01-06 RX ADMIN — NYSTATIN SCH APP: 100000 POWDER TOPICAL at 07:55

## 2019-01-06 RX ADMIN — GUAIFENESIN SCH MG: 600 TABLET, EXTENDED RELEASE ORAL at 20:01

## 2019-01-06 RX ADMIN — IBUPROFEN PRN MG: 200 TABLET, FILM COATED ORAL at 10:26

## 2019-01-06 RX ADMIN — BENZOCAINE AND MENTHOL PRN EA: 15; 3.6 LOZENGE ORAL at 13:52

## 2019-01-06 RX ADMIN — OXYCODONE HYDROCHLORIDE PRN MG: 15 TABLET ORAL at 05:42

## 2019-01-06 RX ADMIN — BENZONATATE PRN MG: 100 CAPSULE, LIQUID FILLED ORAL at 16:16

## 2019-01-06 NOTE — PCMIDPN
Assessment/Plan: 


Assessment:


Influenza A followed by secondary necrotic pneumonia with MSSA.  Cefazolin 

monotherapy.  He is making clinical improvements and today looks less ill and 

less toxic.  Follow-up CT scan from 1/6/2019 shows the extent of necrotizing 

Staph aureus disease to be stable to slightly improved.  His HSV outbreak is 

much improved today as well.  We will continue him on Valtrex 500 mg p.o. Twice 

daily for a 3 day course.  








Plan:


1. Continue IV cefazolin.


2. Continue Valtrex 500 mg p.o. Twice daily x3 days.


3. Continue Fluconazole 100 mg p.o. Daily.


4. Follow clinical appearance.  








  





01/06/19 15:43





Subjective: 


Patient is sitting up in his bed this afternoon eating fruit for lunch.  His 

room is very dark.  He said he is very tired but feeling that he is slowly 

starting to get better now.  Breathing is stable.  Less cough with less 

production.





Objective: 


Cefazolin # 4


Valtrex # 2


Fluconazole # 2 Vital Signs











Temp Pulse Resp BP Pulse Ox


 


 38.7 C H  94   18   108/64   91 L


 


 01/06/19 07:32  01/06/19 07:32  01/06/19 07:32  01/06/19 07:32  01/06/19 07:32








 Laboratory Results





 01/06/19 05:31 





 01/06/19 05:31 











- Physical Exam


General Appearance: WD/WN, alert, no apparent distress, toxic (Mildly)


Respiratory: lungs clear, normal breath sounds, No respiratory distress, No 

crackles, No coarse breath sounds


Cardiac/Chest: regular rate, rhythm, No tachycardia


Skin: normal color, warm/dry, No rash


Neuro/Psych: alert, normal mood/affect, oriented x 3





ICD10 Worksheet


Patient Problems: 


 Problems











Problem Status Onset


 


Influenza Acute  


 


Pneumonia Acute

## 2019-01-06 NOTE — ASMTCMCOM
CM Note

 

CM Note                       

Notes:

Patient discussed with MOHAMUD Drummond. Patient down for chest CT when CM went to visit with patient to 

[facility]. Patient continues on IV antibiotics and experiencing worsening 


shortness of breath and now on oxygen. CM to follow.



D/C Plan: jennyfer independent 

 

Date Signed:  01/06/2019 04:11 PM

Electronically Signed By:Mirtha Faulkner

## 2019-01-06 NOTE — HOSPPROG
Hospitalist Progress Note


Assessment/Plan: 





Carrillo is a 37yo M who was diagnosed with influenza A and returned to the ED due 

to persistent fevers and worsening shortness of breath.





#Fever w associated leukocytosis 


-subclavian (pulled 1/1), blood cultures NGTD


-negative Legionella, HIV, G&C, Syphilis


-repeat echo showed improved cardiac function, no valvular abnormalities


-MRI of brain and neck showed no abscess, diskitis or osteomyelitis


-second set of blood cx show no growth


-still having a fever, will get a CT of his chest-having ongoing pain in his 

upper left back area





#HSV (scrotal region)


-Valtrex bid x 3 days





#sacral cleft, patient said it was having some drainage


-possible fungal, Fluconazole ordered





#MSSA multilobar PNA complicated in association w influenza 


-Cefazolin





#Septic shock: resolved. 


-Required pressors





#Influenza: Tamiflu course completed





#headaches


-takes Tylenol for this


-trial of ibuprofen since he is meeting limit on acetaminophen





#AHRF


-was on room air but now on oxygen





#Lactic acidosis


-resolved





#Substance abuse: snort/smokes meth. 


-Counseled on cessation





#Tobacco dependence: 


-counseled on cessation





#Tachycardia: febrile


-resolved, will dc fluids





#Sciatica: gabapentin





#Social: recently jailed





#DVT ppx: Lovenox





#plan: reviewed his care w Dr Altamirano, concerned he is having fevers and more 

upper back pain-will get a CT scan.  Will dc iv Dilaudid and continue oxy-Carrillo 

is aware of this.  Encouraged him to get OOB and ambulate, he says he is anti-

social and doesn't want to leave his room. Encouraged him to get oob and sit in 

the chair. 


Subjective: Carrillo said he feels worse today, upper back hurts.


Objective: 


 Vital Signs











Temp Pulse Resp BP Pulse Ox


 


 38.7 C H  94   18   108/64   91 L


 


 01/06/19 07:32  01/06/19 07:32  01/06/19 07:32  01/06/19 07:32  01/06/19 07:32








 Laboratory Results





 01/06/19 05:31 





 01/06/19 05:31 





 











PT  17.1 SEC (12.0-15.0)  H  12/29/18  05:15    


 


INR  1.38  (0.83-1.16)  H  12/29/18  05:15    














- Physical Exam


Constitutional: uncomfortable


Eyes: PERRL


Ears, Nose, Mouth, Throat: hearing normal


Cardiovascular: regular rate and rhythym


Respiratory: no respiratory distress, rhonchi (left lung scattered, right lung 

clear overall)


Skin: warm


Musculoskeletal: generalized weakness


Neurologic: AAOx3


Psychiatric: interacting appropriately





ICD10 Worksheet


Patient Problems: 


 Problems











Problem Status Onset


 


Influenza Acute  


 


Pneumonia Acute

## 2019-01-07 RX ADMIN — DICLOFENAC SCH APP: 10 GEL TOPICAL at 17:20

## 2019-01-07 RX ADMIN — ENOXAPARIN SODIUM SCH MG: 100 INJECTION SUBCUTANEOUS at 10:27

## 2019-01-07 RX ADMIN — DICLOFENAC SCH APP: 10 GEL TOPICAL at 14:35

## 2019-01-07 RX ADMIN — Medication SCH MLS: at 21:04

## 2019-01-07 RX ADMIN — Medication SCH MLS: at 05:18

## 2019-01-07 RX ADMIN — IBUPROFEN PRN MG: 200 TABLET, FILM COATED ORAL at 03:48

## 2019-01-07 RX ADMIN — NYSTATIN SCH APP: 100000 POWDER TOPICAL at 11:49

## 2019-01-07 RX ADMIN — GABAPENTIN SCH MG: 300 CAPSULE ORAL at 15:35

## 2019-01-07 RX ADMIN — GUAIFENESIN SCH MG: 600 TABLET, EXTENDED RELEASE ORAL at 10:27

## 2019-01-07 RX ADMIN — DICLOFENAC SCH: 10 GEL TOPICAL at 13:01

## 2019-01-07 RX ADMIN — DICLOFENAC SCH: 10 GEL TOPICAL at 05:18

## 2019-01-07 RX ADMIN — GABAPENTIN SCH MG: 300 CAPSULE ORAL at 21:06

## 2019-01-07 RX ADMIN — BENZOCAINE AND MENTHOL PRN EA: 15; 3.6 LOZENGE ORAL at 10:26

## 2019-01-07 RX ADMIN — OXYCODONE HYDROCHLORIDE PRN MG: 15 TABLET ORAL at 17:18

## 2019-01-07 RX ADMIN — BENZOCAINE AND MENTHOL PRN EA: 15; 3.6 LOZENGE ORAL at 17:18

## 2019-01-07 RX ADMIN — GUAIFENESIN AND CODEINE PHOSPHATE PRN ML: 10; 100 LIQUID ORAL at 13:07

## 2019-01-07 RX ADMIN — BENZOCAINE AND MENTHOL PRN EA: 15; 3.6 LOZENGE ORAL at 21:06

## 2019-01-07 RX ADMIN — GUAIFENESIN AND CODEINE PHOSPHATE PRN ML: 10; 100 LIQUID ORAL at 03:48

## 2019-01-07 RX ADMIN — GUAIFENESIN SCH MG: 600 TABLET, EXTENDED RELEASE ORAL at 21:05

## 2019-01-07 RX ADMIN — NYSTATIN SCH APP: 100000 POWDER TOPICAL at 21:07

## 2019-01-07 RX ADMIN — NYSTATIN SCH APP: 100000 POWDER TOPICAL at 17:20

## 2019-01-07 RX ADMIN — GABAPENTIN SCH MG: 300 CAPSULE ORAL at 10:27

## 2019-01-07 RX ADMIN — FLUTICASONE PROPIONATE SCH: 50 SPRAY, METERED NASAL at 10:28

## 2019-01-07 RX ADMIN — IBUPROFEN PRN MG: 200 TABLET, FILM COATED ORAL at 11:46

## 2019-01-07 RX ADMIN — ACETAMINOPHEN PRN MG: 325 TABLET ORAL at 23:43

## 2019-01-07 RX ADMIN — Medication SCH MLS: at 14:28

## 2019-01-07 RX ADMIN — OXYCODONE HYDROCHLORIDE PRN MG: 15 TABLET ORAL at 21:04

## 2019-01-07 RX ADMIN — GUAIFENESIN AND CODEINE PHOSPHATE PRN ML: 10; 100 LIQUID ORAL at 21:05

## 2019-01-07 RX ADMIN — DICLOFENAC SCH APP: 10 GEL TOPICAL at 21:07

## 2019-01-07 RX ADMIN — FLUCONAZOLE SCH MG: 100 TABLET ORAL at 10:28

## 2019-01-07 NOTE — ASMTCMCOM
CM Note

 

CM Note                       

Notes:

Juju Bang,  (P# [phone]) stopped by and left her card w/ CM. Pts 

tracking bracelet has been removed because he requires imaging during this hospitalization. Pt will 


need to be put it back on once medically stable. CM will need to notify Juju 2 hours in advance 

prior to d/c. CM made a referral to [facility]. Pt may d/c independent when medically stable. CM 

available for other needs.







 

 

Date Signed:  01/07/2019 10:54 AM

Electronically Signed By:JOSS Larsen

## 2019-01-07 NOTE — HOSPPROG
Hospitalist Progress Note


Assessment/Plan: 





Carrillo is a 39yo M who was diagnosed with influenza A and returned to the ED due 

to persistent fevers and worsening shortness of breath.





#Fever w associated leukocytosis 


-subclavian (pulled 1/1), blood cultures NGTD


-negative Legionella, HIV, G&C, Syphilis


-repeat echo showed improved cardiac function, no valvular abnormalities


-MRI of brain and neck showed no abscess, diskitis or osteomyelitis


-second set of blood cx show no growth


-repeat CT yesterday stable


-has not had a fever since yesterday morning





#HSV (scrotal region)


-Valtrex bid x 3 days





#sacral cleft, patient said it was having some drainage


-possible fungal, Fluconazole ordered





#MSSA multilobar PNA complicated in association w influenza 


-Cefazolin





#Septic shock: resolved. 


-Required pressors





#Influenza: Tamiflu course completed





#headaches


-takes Tylenol for this


-trial of ibuprofen since he is meeting limit on acetaminophen





#AHRF


-was on room air but now on oxygen





#Lactic acidosis


-resolved





#Substance abuse: snort/smokes meth. 


-Counseled on cessation





#Tobacco dependence: 


-counseled on cessation





#Tachycardia: febrile


-resolved, will dc fluids





#Sciatica: gabapentin





#Social: recently jailed





#DVT ppx: Lovenox





#plan:continue current treatment, Carrillo looks much improved today.  Is asking 

when he will go home, will be able to stay with his dad who lives here locally.


Subjective: Carrillo is tired but feeling better.


Objective: 


 Vital Signs











Temp Pulse Resp BP Pulse Ox


 


 36.7 C   77   18   104/63   94 


 


 01/07/19 08:00  01/07/19 08:00  01/07/19 08:00  01/07/19 08:00  01/07/19 08:00








 Laboratory Results





 01/06/19 05:31 





 01/06/19 05:31 





 











 01/06/19 01/07/19 01/08/19





 05:59 05:59 05:59


 


Intake Total   318


 


Balance   318








 











PT  17.1 SEC (12.0-15.0)  H  12/29/18  05:15    


 


INR  1.38  (0.83-1.16)  H  12/29/18  05:15    














- Physical Exam


Constitutional: no apparent distress, appears nourished


Eyes: PERRL


Ears, Nose, Mouth, Throat: hearing normal


Cardiovascular: regular rate and rhythym


Respiratory: no respiratory distress (lung sounds improved today, right lung cTA

, left lung w few rhonchi)


Skin: warm


Musculoskeletal: generalized weakness


Neurologic: AAOx3


Psychiatric: interacting appropriately





ICD10 Worksheet


Patient Problems: 


 Problems











Problem Status Onset


 


Influenza Acute  


 


Pneumonia Acute

## 2019-01-08 RX ADMIN — DICLOFENAC SCH: 10 GEL TOPICAL at 05:39

## 2019-01-08 RX ADMIN — Medication SCH MLS: at 05:39

## 2019-01-08 RX ADMIN — OXYCODONE HYDROCHLORIDE PRN MG: 15 TABLET ORAL at 19:54

## 2019-01-08 RX ADMIN — GUAIFENESIN SCH MG: 600 TABLET, EXTENDED RELEASE ORAL at 19:53

## 2019-01-08 RX ADMIN — DICLOFENAC SCH APP: 10 GEL TOPICAL at 14:56

## 2019-01-08 RX ADMIN — NYSTATIN SCH: 100000 POWDER TOPICAL at 23:24

## 2019-01-08 RX ADMIN — GUAIFENESIN SCH MG: 600 TABLET, EXTENDED RELEASE ORAL at 09:44

## 2019-01-08 RX ADMIN — OXYCODONE HYDROCHLORIDE PRN MG: 15 TABLET ORAL at 14:55

## 2019-01-08 RX ADMIN — GABAPENTIN SCH MG: 300 CAPSULE ORAL at 09:44

## 2019-01-08 RX ADMIN — GABAPENTIN SCH MG: 300 CAPSULE ORAL at 15:00

## 2019-01-08 RX ADMIN — NYSTATIN SCH: 100000 POWDER TOPICAL at 17:10

## 2019-01-08 RX ADMIN — BENZOCAINE AND MENTHOL PRN EA: 15; 3.6 LOZENGE ORAL at 17:57

## 2019-01-08 RX ADMIN — FLUCONAZOLE SCH MG: 100 TABLET ORAL at 09:44

## 2019-01-08 RX ADMIN — BENZOCAINE AND MENTHOL PRN EA: 15; 3.6 LOZENGE ORAL at 15:22

## 2019-01-08 RX ADMIN — NYSTATIN SCH APP: 100000 POWDER TOPICAL at 14:56

## 2019-01-08 RX ADMIN — BENZONATATE PRN MG: 100 CAPSULE, LIQUID FILLED ORAL at 17:57

## 2019-01-08 RX ADMIN — GUAIFENESIN AND CODEINE PHOSPHATE PRN ML: 10; 100 LIQUID ORAL at 06:04

## 2019-01-08 RX ADMIN — GUAIFENESIN AND CODEINE PHOSPHATE PRN ML: 10; 100 LIQUID ORAL at 23:23

## 2019-01-08 RX ADMIN — Medication SCH MLS: at 14:57

## 2019-01-08 RX ADMIN — GABAPENTIN SCH MG: 300 CAPSULE ORAL at 23:17

## 2019-01-08 RX ADMIN — DICLOFENAC SCH APP: 10 GEL TOPICAL at 19:55

## 2019-01-08 RX ADMIN — IBUPROFEN PRN MG: 200 TABLET, FILM COATED ORAL at 06:05

## 2019-01-08 RX ADMIN — FLUTICASONE PROPIONATE SCH: 50 SPRAY, METERED NASAL at 09:55

## 2019-01-08 RX ADMIN — GUAIFENESIN AND CODEINE PHOSPHATE PRN ML: 10; 100 LIQUID ORAL at 12:54

## 2019-01-08 RX ADMIN — DICLOFENAC SCH: 10 GEL TOPICAL at 17:09

## 2019-01-08 RX ADMIN — ENOXAPARIN SODIUM SCH MG: 100 INJECTION SUBCUTANEOUS at 09:44

## 2019-01-08 RX ADMIN — Medication SCH MLS: at 23:17

## 2019-01-08 NOTE — HOSPPROG
Hospitalist Progress Note


Assessment/Plan: 





Carrillo is a 37yo M who was diagnosed with influenza A and returned to the ED due 

to persistent fevers and worsening shortness of breath. First encounter, chart 

reviewed. 





#Fever w associated leukocytosis 


-subclavian (pulled 1/1), blood cultures NGTD


-negative Legionella, HIV, G&C, Syphilis


-repeat echo showed improved cardiac function, no valvular abnormalities


-MRI of brain and neck showed no abscess, diskitis or osteomyelitis


-second set of blood cx show no growth


-repeat CT yesterday stable


-has not had a fever for >48 hours





#HSV (scrotal region)


-Valtrex bid x 3 days





#sacral cleft, patient said it was having some drainage


-possible fungal, Fluconazole ordered





#MSSA multilobar PNA complicated in association w influenza 


-Cefazolin





#Septic shock: resolved. 


-Required pressors





#Influenza: Tamiflu course completed





#headaches


-takes Tylenol for this


-trial of ibuprofen since he is meeting limit on acetaminophen





#AHRF


-was on room air but now on oxygen





#Lactic acidosis


-resolved





#Substance abuse: snort/smokes meth. 


-Counseled on cessation





#Tobacco dependence: 


-counseled on cessation





#Tachycardia: febrile


-resolved, will dc fluids





#Sciatica: gabapentin





#Social: recently jailed





#DVT ppx: Lovenox





#plan:continue current treatment Is asking when he will go home, will be able 

to stay with his dad who lives here locally.


Subjective: Feeling better today. Up walking the halls. No specific issues.


Objective: 


 Vital Signs











Temp Pulse Resp BP Pulse Ox


 


 36.8 C   62   16   92/59 L  89 L


 


 01/08/19 07:43  01/08/19 07:43  01/08/19 07:43  01/08/19 07:43  01/08/19 00:00








 Microbiology











 01/02/19 09:18 Blood Culture - Final





 Blood 


 


 01/02/19 09:18 Blood Culture - Final





 Blood 








 Laboratory Results





 01/06/19 05:31 





 01/06/19 05:31 





 











 01/07/19 01/08/19 01/09/19





 05:59 05:59 05:59


 


Intake Total  518 


 


Balance  518 








 











PT  17.1 SEC (12.0-15.0)  H  12/29/18  05:15    


 


INR  1.38  (0.83-1.16)  H  12/29/18  05:15    














- Physical Exam


Constitutional: no apparent distress, appears nourished, not in pain


Eyes: PERRL, anicteric sclera, EOMI


Ears, Nose, Mouth, Throat: moist mucous membranes, hearing normal, ears appear 

normal


Cardiovascular: regular rate and rhythym, No JVD, No edema


Respiratory: no respiratory distress, reduced air movement, rhonchi


Gastrointestinal: normoactive bowel sounds, No tenderness, No ascites


Skin: warm, normal color, rash, No mottled


Musculoskeletal: normal joint ROM, no joint effusions, generalized weakness


Neurologic: AAOx3


Psychiatric: interacting appropriately, not anxious, not encephalopathic





ICD10 Worksheet


Patient Problems: 


 Problems











Problem Status Onset


 


Influenza Acute  


 


Pneumonia Acute

## 2019-01-09 VITALS — DIASTOLIC BLOOD PRESSURE: 71 MMHG | SYSTOLIC BLOOD PRESSURE: 101 MMHG

## 2019-01-09 RX ADMIN — DICLOFENAC SCH: 10 GEL TOPICAL at 06:02

## 2019-01-09 RX ADMIN — Medication SCH MLS: at 05:40

## 2019-01-09 RX ADMIN — NYSTATIN SCH: 100000 POWDER TOPICAL at 10:13

## 2019-01-09 RX ADMIN — ENOXAPARIN SODIUM SCH MG: 100 INJECTION SUBCUTANEOUS at 10:12

## 2019-01-09 RX ADMIN — GUAIFENESIN SCH MG: 600 TABLET, EXTENDED RELEASE ORAL at 10:12

## 2019-01-09 RX ADMIN — GUAIFENESIN AND CODEINE PHOSPHATE PRN ML: 10; 100 LIQUID ORAL at 05:43

## 2019-01-09 RX ADMIN — FLUTICASONE PROPIONATE SCH: 50 SPRAY, METERED NASAL at 10:13

## 2019-01-09 RX ADMIN — FLUCONAZOLE SCH MG: 100 TABLET ORAL at 10:12

## 2019-01-09 RX ADMIN — GABAPENTIN SCH MG: 300 CAPSULE ORAL at 10:12

## 2019-01-09 NOTE — ASMTCMCOM
CM Note

 

CM Note                       

Notes:

Pt completed IV abx, will dc independent back to parents house.  Juju notified 

and will replace ankle braclet.



DC Plan: Independent

 

Date Signed:  01/09/2019 11:50 AM

Electronically Signed By:Allyson Shea RN

## 2019-01-09 NOTE — ASMTLACE
LACE

 

Length of stay for            Answers:  7-13 days                             

current admission                                                             

Acuity / Level of             Answers:  Yes                                   

Care: Did the patient                                                         

have an inpatient                                                             

admission?                                                                    

Comorbidities - select        Answers:  Other                         Notes:  Sciatica, Former 

all that apply                                                                smoker, Drug user

# of Emergency department     Answers:  5-8                                   

visits in the last 6                                                          

months                                                                        

Social determinants           Answers:  History of substance                  

                                        abuse (ETOH, street                   

                                        drugs, prescription                   

                                        drugs, etc.)                          

Score: 16

 

Date Signed:  01/09/2019 11:48 AM

Electronically Signed By:Allyson Shea RN

## 2019-01-09 NOTE — PCMIDPN
Assessment/Plan: 


Assessment/Plan:


* Multifocal pneumonia due to MSSA status post influenza A:  Continues with 

gradual clinical improvement.  No further fever present.  Remains on room air 

without need for supplemental oxygen.  Will transition to doxycycline 100 mg 

orally twice daily for at least 2 more weeks of therapy given presence of 

necrotizing pneumonia.  Side effects of doxycycline discussed with patient 

including risk of photosensitivity, esophagitis, and need to avoid concomitant 

calcium intake.  Will have follow-up with Dr. Altamirano next week.


* Perineal HSV:  Resolved.  Patient request suppressive acyclovir which he has 

used in the past at 400 mg orally daily.  Still has some element of yeast 

infection so will continue fluconazole x7 additional days.





Clinical findings and plan were reviewed with patient and Maria Eugenia Castro NP.





01/09/19 12:51





Subjective: 





Patient feels significantly improved.  No significant chest pain or shortness 

of breath.  Some cough with sputum production persists.  HSV lesions resolving.


Objective: 


 Vital Signs











Temp Pulse Resp BP Pulse Ox


 


 37.1 C   80   16   101/71   89 L


 


 01/09/19 08:00  01/09/19 08:00  01/09/19 08:00  01/09/19 08:00  01/09/19 08:00








 Laboratory Results





 01/06/19 05:31 





 01/06/19 05:31 





 











 01/08/19 01/09/19 01/10/19





 05:59 05:59 05:59


 


Intake Total 518  


 


Balance 518  








Cefazolin 2 g IV q.8 hours # 7, antibiotics # 13


Fluconazole/Valtrex





- Physical Exam


General Appearance: alert, no apparent distress


EENT: No scleral icterus, No thrush, No conjunctival petechiae


Respiratory: lungs clear, No respiratory distress


Cardiac/Chest: regular rate, rhythm


Extremities: No inflammation


Abdomen: non-tender, No distended


Male Genitalia: other (HSV resolved with some mild residual candidal rash)





ICD10 Worksheet


Patient Problems: 


 Problems











Problem Status Onset


 


Influenza Acute  


 


Pneumonia Acute

## 2019-01-09 NOTE — GDS
DISCHARGE DIAGNOSES:  

1.  Leukocytosis.

2.  Fever.

3.  Herpes simplex virus.

4.  Methicillin-susceptible Staphylococcus aureus multilobular pneumonia.

5.  Septic shock.

6.  Influenza.

7.  Lactic acidosis.

8.  Substance abuse.

9.  Tobacco dependency.

10.  Tachycardia.



CONSULTATIONS:  Infectious Disease.



PHYSICAL EXAM:  GENERAL:  The patient is alert.  VITAL SIGNS:  Afebrile at 37.1, pulse 80, respirator
y rate 16.  Blood pressure is 101/71.  He is saturating 90% on room air.  I have seen and evaluated t
he patient on the day of discharge.



HOSPITAL COURSE:  

1.  The patient is a 38-year-old male who presented to the emergency room with complaints of fever an
d shortness of breath.  He was evaluated and diagnosed with MSSA multilobular pneumonia.  During this
 hospitalization, he received antibiotic therapy and will continue antibiotics in the outpatient sett
ing.

2.  Fever and leukocytosis.  This is multifactorial and has been evaluated.  His fever has resolved.

3.  HSV.  Will continue Valtrex in the outpatient setting with suppressive therapy.

4.  Septic shock.  The patient required pressors in the intensive care unit.  This has resolved.

5.  Influenza.  Tamiflu has been completed.

6.  Lactic acidosis.  This has resolved.

7.  History of substance abuse.  Cessation counseling has been administered.

8.  Tobacco dependency.

9.  Tachycardia.  This has resolved during his hospital course.



DISPOSITION:  The patient will be discharged independently to his parent's house.



FOLLOWUP:  With [facility], as well as Infectious Disease.



DISCHARGE MEDICATIONS:  Please refer to EMR form.  I have provided the patient prescriptions for Valt
donnell, nystatin, doxycycline, acyclovir, Diflucan.  Discussed the patient's disposition with Dr. Yaniv boyd of Infectious Disease, who is in agreement with this plan. 



I spent greater than 35 minutes in the care, coordination, and management of the patient's discharge.






Job #:  [id number]

## 2019-01-18 ENCOUNTER — HOSPITAL ENCOUNTER (OUTPATIENT)
Dept: HOSPITAL 80 - FIMAGING | Age: 39
End: 2019-01-18
Attending: INTERNAL MEDICINE
Payer: MEDICAID

## 2019-01-18 DIAGNOSIS — J10.1: Primary | ICD-10-CM

## 2019-01-18 DIAGNOSIS — J18.9: ICD-10-CM
